# Patient Record
Sex: MALE | Employment: OTHER | URBAN - METROPOLITAN AREA
[De-identification: names, ages, dates, MRNs, and addresses within clinical notes are randomized per-mention and may not be internally consistent; named-entity substitution may affect disease eponyms.]

---

## 2018-06-12 NOTE — H&P
78 y.o. male for open access colonoscopy for screening   Additional data for completion of the targeted pre-endoscopy H&P will be provided under 'H&P interval notes'. Please see that document which will be attached to this. John Good MD  Last colon 2015 3 adenomas.

## 2018-06-13 ENCOUNTER — ANESTHESIA (OUTPATIENT)
Dept: ENDOSCOPY | Age: 78
End: 2018-06-13
Payer: MEDICARE

## 2018-06-13 ENCOUNTER — ANESTHESIA EVENT (OUTPATIENT)
Dept: ENDOSCOPY | Age: 78
End: 2018-06-13
Payer: MEDICARE

## 2018-06-13 ENCOUNTER — HOSPITAL ENCOUNTER (OUTPATIENT)
Age: 78
Setting detail: OUTPATIENT SURGERY
Discharge: HOME | End: 2018-06-13
Attending: SPECIALIST | Admitting: SPECIALIST
Payer: MEDICARE

## 2018-06-13 VITALS
OXYGEN SATURATION: 100 % | TEMPERATURE: 97.5 F | WEIGHT: 162 LBS | HEIGHT: 71 IN | BODY MASS INDEX: 22.68 KG/M2 | RESPIRATION RATE: 11 BRPM | SYSTOLIC BLOOD PRESSURE: 128 MMHG | HEART RATE: 59 BPM | DIASTOLIC BLOOD PRESSURE: 92 MMHG

## 2018-06-13 PROCEDURE — 74011250636 HC RX REV CODE- 250/636: Performed by: PHYSICIAN ASSISTANT

## 2018-06-13 PROCEDURE — 74011000250 HC RX REV CODE- 250

## 2018-06-13 PROCEDURE — 76040000019: Performed by: SPECIALIST

## 2018-06-13 PROCEDURE — 76060000031 HC ANESTHESIA FIRST 0.5 HR: Performed by: SPECIALIST

## 2018-06-13 PROCEDURE — 74011250636 HC RX REV CODE- 250/636

## 2018-06-13 RX ORDER — LANOLIN ALCOHOL/MO/W.PET/CERES
1 CREAM (GRAM) TOPICAL DAILY
COMMUNITY

## 2018-06-13 RX ORDER — NALOXONE HYDROCHLORIDE 0.4 MG/ML
0.4 INJECTION, SOLUTION INTRAMUSCULAR; INTRAVENOUS; SUBCUTANEOUS
Status: DISCONTINUED | OUTPATIENT
Start: 2018-06-13 | End: 2018-06-13 | Stop reason: HOSPADM

## 2018-06-13 RX ORDER — EPINEPHRINE 0.1 MG/ML
1 INJECTION INTRACARDIAC; INTRAVENOUS
Status: DISCONTINUED | OUTPATIENT
Start: 2018-06-13 | End: 2018-06-13 | Stop reason: HOSPADM

## 2018-06-13 RX ORDER — FLUMAZENIL 0.1 MG/ML
0.2 INJECTION INTRAVENOUS
Status: DISCONTINUED | OUTPATIENT
Start: 2018-06-13 | End: 2018-06-13 | Stop reason: HOSPADM

## 2018-06-13 RX ORDER — SODIUM CHLORIDE 9 MG/ML
50 INJECTION, SOLUTION INTRAVENOUS CONTINUOUS
Status: DISCONTINUED | OUTPATIENT
Start: 2018-06-13 | End: 2018-06-13 | Stop reason: HOSPADM

## 2018-06-13 RX ORDER — LANOLIN ALCOHOL/MO/W.PET/CERES
325 CREAM (GRAM) TOPICAL
COMMUNITY

## 2018-06-13 RX ORDER — PROPOFOL 10 MG/ML
INJECTION, EMULSION INTRAVENOUS
Status: DISCONTINUED | OUTPATIENT
Start: 2018-06-13 | End: 2018-06-13 | Stop reason: HOSPADM

## 2018-06-13 RX ORDER — LIDOCAINE HYDROCHLORIDE 20 MG/ML
INJECTION, SOLUTION EPIDURAL; INFILTRATION; INTRACAUDAL; PERINEURAL AS NEEDED
Status: DISCONTINUED | OUTPATIENT
Start: 2018-06-13 | End: 2018-06-13 | Stop reason: HOSPADM

## 2018-06-13 RX ORDER — PROPOFOL 10 MG/ML
INJECTION, EMULSION INTRAVENOUS AS NEEDED
Status: DISCONTINUED | OUTPATIENT
Start: 2018-06-13 | End: 2018-06-13 | Stop reason: HOSPADM

## 2018-06-13 RX ORDER — FENTANYL CITRATE 50 UG/ML
25 INJECTION, SOLUTION INTRAMUSCULAR; INTRAVENOUS AS NEEDED
Status: DISCONTINUED | OUTPATIENT
Start: 2018-06-13 | End: 2018-06-13 | Stop reason: HOSPADM

## 2018-06-13 RX ORDER — MIDAZOLAM HYDROCHLORIDE 1 MG/ML
.25-5 INJECTION, SOLUTION INTRAMUSCULAR; INTRAVENOUS AS NEEDED
Status: DISCONTINUED | OUTPATIENT
Start: 2018-06-13 | End: 2018-06-13 | Stop reason: HOSPADM

## 2018-06-13 RX ORDER — ATROPINE SULFATE 0.1 MG/ML
0.5 INJECTION INTRAVENOUS
Status: DISCONTINUED | OUTPATIENT
Start: 2018-06-13 | End: 2018-06-13 | Stop reason: HOSPADM

## 2018-06-13 RX ORDER — DEXTROMETHORPHAN/PSEUDOEPHED 2.5-7.5/.8
1.2 DROPS ORAL
Status: DISCONTINUED | OUTPATIENT
Start: 2018-06-13 | End: 2018-06-13 | Stop reason: HOSPADM

## 2018-06-13 RX ADMIN — PROPOFOL 80 MG: 10 INJECTION, EMULSION INTRAVENOUS at 10:20

## 2018-06-13 RX ADMIN — SODIUM CHLORIDE: 900 INJECTION, SOLUTION INTRAVENOUS at 10:00

## 2018-06-13 RX ADMIN — LIDOCAINE HYDROCHLORIDE 40 MG: 20 INJECTION, SOLUTION EPIDURAL; INFILTRATION; INTRACAUDAL; PERINEURAL at 10:20

## 2018-06-13 RX ADMIN — PROPOFOL 140 MCG/KG/MIN: 10 INJECTION, EMULSION INTRAVENOUS at 10:20

## 2018-06-13 NOTE — ROUTINE PROCESS
[facility]  1940  [id number]    Situation:  Verbal report received from: Gerson Lundberg RN   Procedure: Procedure(s):  COLONOSCOPY    Background:    Preoperative diagnosis: PERSONAL HISORY COLONIC POLYPS  Postoperative diagnosis: Diverticulosis    :  Dr. Janiya Owens  Assistant(s): Endoscopy Technician-1: Ann Hagan  Endoscopy RN-1: Mehdi Swain RN    Specimens: * No specimens in log *  H. Pylori  no    Assessment:  Intra-procedure medications       Anesthesia gave intra-procedure sedation and medications, see anesthesia flow sheet yes    Intravenous fluids: NS@ KVO     Vital signs stable     Abdominal assessment: round and soft     Recommendation:  Discharge patient per MD order  Family or Friend   Permission to share finding with family or friend yes    Endoscopy discharge instructions have been reviewed and given to patient and spouse. The patient and spouse verbalized understanding and acceptance of instructions.

## 2018-06-13 NOTE — PROCEDURES
[facility] SAMANTHA Kelly MD  [phone]      2018    Colonoscopy Procedure Note  Coreen Coronel  :  1940  Dheeraj Medical Record Number: [id number]    Indications:     Personal history of colon polyps (screening only), Screening colonoscopy  PCP:  Celestina Grissom MD  Anesthesia/Sedation: Conscious Sedation/Moderate Sedation  Endoscopist:  Dr. Sylvain Villarreal  Complications:  None  Estimated Blood Loss:  None    Permit:  The indications, risks, benefits and alternatives were reviewed with the patient or their decision maker who was provided an opportunity to ask questions and all questions were answered. The specific risks of colonoscopy with conscious sedation were reviewed, including but not limited to anesthetic complication, bleeding, adverse drug reaction, missed lesion, infection, IV site reactions, and intestinal perforation which would lead to the need for surgical repair. Alternatives to colonoscopy including radiographic imaging, observation without testing, or laboratory testing were reviewed including the limitations of those alternatives. After considering the options and having all their questions answered, the patient or their decision maker provided both verbal and written consent to proceed. Procedure in Detail:  After obtaining informed consent, positioning of the patient in the left lateral decubitus position, and conduction of a pre-procedure pause or \"time out\" the endoscope was introduced into the anus and advanced to the cecum, which was identified by the ileocecal valve and appendiceal orifice. The quality of the colonic preparation was good. A careful inspection was made as the colonoscope was withdrawn, findings and interventions are described below. Findings:    There is diverticulosis in the sigmoid colon without complications such as bleeding, inflammatory change, or luminal narrowing. Specimens:    none    Complications:   None; patient tolerated the procedure well. Impression:  Normal colonoscopy to the cecum, with no evidence of neoplasia, clinically significant diverticular disease, or mucosal abnormality. Recommendations:     - Follow up with primary care physician. Given age 78 and no polyps found today; further colorectal cancer screening is not indicated. Naturally if signs or symptoms of colonic disease develop a diagnostic exam can be considered at any age. Thank you for entrusting me with this patient's care. Please do not hesitate to contact me with any questions or if I can be of assistance with any of your other patients' GI needs.     Signed By: Micheal Desai MD                        June 13, 2018

## 2018-06-13 NOTE — PERIOP NOTES

## 2018-06-13 NOTE — IP AVS SNAPSHOT
[facility] 
 
 
 [address] [place] [address] Road [address] [place] [address] 
[phone] Patient: Shelley Paul MRN: [id number] :1940 About your hospitalization You were admitted on:  2018 You last received care in the:  [facility] ENDOSCOPY You were discharged on:  2018 Why you were hospitalized Your primary diagnosis was:  Not on File Follow-up Information None Discharge Orders None A check kristopher indicates which time of day the medication should be taken. My Medications CONTINUE taking these medications Instructions Each Dose to Equal  
 Morning Noon Evening Bedtime ALPRAZolam 0.25 mg tablet Commonly known as:  Lamount Marten Your last dose was: Your next dose is: Take 0.5 mg by mouth nightly as needed for Anxiety. 0.5 mg  
    
   
   
   
  
 DAILY MULTIPLE tablet Generic drug:  multivitamin Your last dose was: Your next dose is: Take 1 Tab by mouth daily. 1 Tab  
    
   
   
   
  
 glucosamine-chondroitin 500-400 mg Cap Commonly known as:  [address] [place] [address] Your last dose was: Your next dose is: Take 1 Cap by mouth daily. 1 Cap Iron 325 mg (65 mg iron) tablet Generic drug:  ferrous sulfate Your last dose was: Your next dose is: Take  by mouth Daily (before breakfast). PLAVIX 75 mg Tab Generic drug:  clopidogrel Your last dose was: Your next dose is: Take 75 mg by mouth daily. 75 mg  
    
   
   
   
  
 pravastatin 20 mg tablet Commonly known as:  PRAVACHOL Your last dose was: Your next dose is: Take 20 mg by mouth nightly. 20 mg Discharge Instructions [address] Anny Mendoza. Glee Councilman, [address] [place] [address] 
[phone] June 13, 2018 Shea Mendez YOB: 1940 COLONOSCOPY DISCHARGE INSTRUCTIONS If there is redness at IV site you should apply warm compress to area. If redness or soreness persist contact Dr. Glee Councilman' or your primary care doctor. There may be a slight amount of blood passed from the rectum. Gaseous discomfort may develop, but walking, belching will help relieve this. You may not operate a vehicle for 12 hours You may not operate machinery or dangerous appliances for rest of today You may not drink alcoholic beverages for 12 hours Avoid making any critical decisions for 24 hours DIET: 
You may resume your normal diet, but some patients find that heavy or large meals may lead to indigestion or vomiting. I suggest a light meal as first food intake. MEDICATIONS: 
The use of some over-the-counter pain medication may lead to bleeding after colon biopsies or polyp removal.  Tylenol (also called acetaminophen) is safe to take even if you have had colonoscopy with polyp removal.  Based on the procedure you had today you may safely take aspirin or aspirin-like products for the next ten (10) days. Remember that Tylenol (also called acetaminophen) is safe to take after colonoscopy even if you have had biopsies or polyps removed. ACTIVITY: 
You may resume your normal household activities, but it is recommended that you spend the remainder of the day resting -  avoid any strenuous activity. CALL DR. Lynnette Conway' OFFICE IF: Increasing pain, nausea, vomiting Abdominal distension (swelling) Significant new or increased bleeding (oral or rectal) Fever/Chills Chest pain/shortness of breath Additional instructions: No polyps found today - great news. You do not have to do colonoscopy again unless you develop new symptoms such as bleeding abdominal pain or the like. It was an honor to be your doctor today. Please let me or my office staff know if you have any feedback about today's procedure. Naun Echevarria MD 
 
Colonoscopy saves lives, and can prevent colon cancer. Everyone aged 48 or older needs colonoscopy. Tell your family and friends: get the test! 
 
 
3 Introducing [facility]! [facility] introduces LumiFold patient portal. Now you can access parts of your medical record, email your doctor's office, and request medication refills online. 1. In your internet browser, go to [email or web address] 2. Click on the First Time User? Click Here link in the Sign In box. You will see the New Member Sign Up page. 3. Enter your LumiFold Access Code exactly as it appears below. You will not need to use this code after youve completed the sign-up process. If you do not sign up before the expiration date, you must request a new code. · LumiFold Access Code: [email or web address] Expires: 9/11/2018  8:58 AM 
 
4. Enter the last four digits of your Social Security Number (xxxx) and Date of Birth (mm/dd/yyyy) as indicated and click Submit. You will be taken to the next sign-up page. 5. Create a LumiFold ID. This will be your LumiFold login ID and cannot be changed, so think of one that is secure and easy to remember. 6. Create a LumiFold password. You can change your password at any time. 7. Enter your Password Reset Question and Answer. This can be used at a later time if you forget your password. 8. Enter your e-mail address. You will receive e-mail notification when new information is available in [address] 9. Click Sign Up. You can now view and download portions of your medical record. 10. Click the Download Summary menu link to download a portable copy of your medical information. If you have questions, please visit the Frequently Asked Questions section of the LumiFold website.  Remember, LumiFold is NOT to be used for urgent needs. For medical emergencies, dial 911. Now available from your iPhone and Android! Introducing Kojo Villatoro As a [facility] patient, I wanted to make you aware of our electronic visit tool called Kojo Villatoro. [facility] 24/7 allows you to connect within minutes with a medical provider 24 hours a day, seven days a week via a mobile device or tablet or logging into a secure website from your computer. You can access Kojo Villatoro from anywhere in the [place]. A virtual visit might be right for you when you have a simple condition and feel like you just dont want to get out of bed, or cant get away from work for an appointment, when your regular [facility] provider is not available (evenings, weekends or holidays), or when youre out of town and need minor care. Electronic visits cost only $49 and if the [facility] 24/7 provider determines a prescription is needed to treat your condition, one can be electronically transmitted to a nearby pharmacy*. Please take a moment to enroll today if you have not already done so. The enrollment process is free and takes just a few minutes. To enroll, please download the [facility] 24/7 so to your tablet or phone, or visit [email or web address] to enroll on your computer. And, as an [address] [place] [address] patient with a Wideo account, the results of your visits will be scanned into your electronic medical record and your primary care provider will be able to view the scanned results. We urge you to continue to see your regular [facility] provider for your ongoing medical care. And while your primary care provider may not be the one available when you seek a Kojo Villatoro virtual visit, the peace of mind you get from getting a real diagnosis real time can be priceless. For more information on Kojo Villatoro, view our Frequently Asked Questions (FAQs) at [email or web address].  
 
Sincerely, 
 
 Gabrielle Arnold MD 
Chief Medical Officer [address] *:  certain medications cannot be prescribed via Kojo Villatoro Providers Seen During Your Hospitalization Provider Specialty Primary office phone Zoila Navarrete MD Gastroenterology [phone] Your Primary Care Physician (PCP) Primary Care Physician Office Phone Office Fax Harsha Dawn [phone] You are allergic to the following No active allergies Recent Documentation Height Weight BMI Smoking Status 1.803 m 73.5 kg 22.59 kg/m2 Current Every Day Smoker Emergency Contacts Name Discharge Info Relation Home Work Mobile Lindsey Cutler DISCHARGE CAREGIVER [3] Spouse [3] [phone] Patient Belongings The following personal items are in your possession at time of discharge: 
  Dental Appliances: With patient  Visual Aid: None Please provide this summary of care documentation to your next provider. Signatures-by signing, you are acknowledging that this After Visit Summary has been reviewed with you and you have received a copy. Patient Signature:  ____________________________________________________________ Date:  ____________________________________________________________  
  
[employer] Provider Signature:  ____________________________________________________________ Date:  ____________________________________________________________

## 2018-06-13 NOTE — ANESTHESIA POSTPROCEDURE EVALUATION
Post-Anesthesia Evaluation and Assessment    Patient: Sunil Nguyen MRN: [id number]  SSN: [id number]    YOB: 1940  Age: 78 y.o. Sex: male       Cardiovascular Function/Vital Signs  Visit Vitals    /83    Pulse 62    Temp 36.4 °C (97.5 °F)    Resp 17    Ht 5' 11\" (1.803 m)    Wt 73.5 kg (162 lb)    SpO2 99%    BMI 22.59 kg/m2       Patient is status post MAC anesthesia for Procedure(s):  COLONOSCOPY. Nausea/Vomiting: None    Postoperative hydration reviewed and adequate. Pain:  Pain Scale 1: Numeric (0 - 10) (06/13/18 1046)  Pain Intensity 1: 0 (06/13/18 1046)   Managed    Neurological Status: At baseline    Mental Status and Level of Consciousness: Arousable    Pulmonary Status:   O2 Device: Room air (06/13/18 1046)   Adequate oxygenation and airway patent    Complications related to anesthesia: None    Post-anesthesia assessment completed.  No concerns    Signed By: Mirella Roa MD     June 13, 2018

## 2018-06-13 NOTE — PROGRESS NOTES
Dr Scott Coley in to speak to patient and wife, Regis Burkitt,   Discharge instructions were given bedside with both as well

## 2018-06-13 NOTE — ANESTHESIA PREPROCEDURE EVALUATION
Anesthetic History   No history of anesthetic complications            Review of Systems / Medical History  Patient summary reviewed, nursing notes reviewed and pertinent labs reviewed    Pulmonary          Smoker         Neuro/Psych   Within defined limits           Cardiovascular    Hypertension          Past MI, CAD, cardiac stents and hyperlipidemia    Exercise tolerance: >4 METS  Comments: Not on beta blocker    yard work including push mower without symptoms  Comments: MI & Stent 1997  Femoral stents   GI/Hepatic/Renal  Within defined limits              Endo/Other  Within defined limits           Other Findings   Comments: Bilateral carotid bruits           Physical Exam    Airway  Mallampati: II  TM Distance: 4 - 6 cm  Neck ROM: normal range of motion   Mouth opening: Normal     Cardiovascular  Regular rate and rhythm,  S1 and S2 normal,  no murmur, click, rub, or gallop  Rhythm: regular  Rate: normal         Dental    Dentition: Full upper dentures and Full lower dentures     Pulmonary  Breath sounds clear to auscultation               Abdominal  GI exam deferred       Other Findings            Anesthetic Plan    ASA: 3  Anesthesia type: MAC            Anesthetic plan and risks discussed with: Patient

## 2018-06-13 NOTE — INTERVAL H&P NOTE
Pre-Endoscopy H&P Update  Chief complaint/HPI/ROS:  The indication for the procedure, the patient's history and the patient's current medications are reviewed prior to the procedure and that data is reported on the H&P to which this document is attached. Any significant complaints with regard to organ systems will be noted, and if not mentioned then a review of systems is not contributory. Past Medical History:   Diagnosis Date    CAD (coronary artery disease) 1997    stents      Past Surgical History:   Procedure Laterality Date    CARDIAC SURG PROCEDURE UNLIST  1997    heart and leg stent      Social   Social History   Substance Use Topics    Smoking status: Current Every Day Smoker     Packs/day: 1.50     Years: 60.00    Smokeless tobacco: Not on file    Alcohol use 7.0 oz/week     14 Shots of liquor per week      Family History   Problem Relation Age of Onset    Heart Disease Mother       No Known Allergies   Prior to Admission Medications   Prescriptions Last Dose Informant Patient Reported? Taking? ALPRAZolam (XANAX) 0.25 mg tablet 6/12/2018 at Unknown time  Yes Yes   Sig: Take 0.5 mg by mouth nightly as needed for Anxiety. clopidogrel (PLAVIX) 75 mg tablet 6/12/2018 at Unknown time  Yes Yes   Sig: Take 75 mg by mouth daily. ferrous sulfate (IRON) 325 mg (65 mg iron) tablet   Yes Yes   Sig: Take  by mouth Daily (before breakfast). glucosamine-chondroitin (ARTHX) 500-400 mg cap 6/12/2018 at Unknown time  Yes Yes   Sig: Take 1 Cap by mouth daily. multivitamin (DAILY MULTIPLE) tablet 6/12/2018 at Unknown time  Yes Yes   Sig: Take 1 Tab by mouth daily. pravastatin (PRAVACHOL) 20 mg tablet 6/12/2018 at Unknown time  Yes Yes   Sig: Take 20 mg by mouth nightly. Facility-Administered Medications: None       PHYSICAL EXAM:  The patient is examined immediately prior to the procedure.   Visit Vitals    /67    Pulse 64    Temp 97.9 °F (36.6 °C)    Resp 20    Ht 5' 11\" (1.803 m)    Wt 73.5 kg (162 lb)    SpO2 99%    BMI 22.59 kg/m2     Gen: Appears comfortable, no distress. Pulm: comfortable respirations with no abnormal audible breath sounds  CV: heart regular, well perfused  GI: abdomen flat. PLAN:  Informed consent discussion held, patient afforded an opportunity to ask questions and all questions answered. After being advised of the risks, benefits, and alternatives, the patient requested that we proceed and indicated so on a written consent form. Will proceed with procedure as planned.   Samantha Dixon MD

## 2018-06-13 NOTE — DISCHARGE INSTRUCTIONS
[facility] SAMANTHA Everett MD  [phone]      June 13, 2018    Linnette Serrano  YOB: 1940    COLONOSCOPY DISCHARGE INSTRUCTIONS    If there is redness at IV site you should apply warm compress to area. If redness or soreness persist contact Dr. Spencer Everett' or your primary care doctor. There may be a slight amount of blood passed from the rectum. Gaseous discomfort may develop, but walking, belching will help relieve this. You may not operate a vehicle for 12 hours  You may not operate machinery or dangerous appliances for rest of today  You may not drink alcoholic beverages for 12 hours  Avoid making any critical decisions for 24 hours    DIET:  You may resume your normal diet, but some patients find that heavy or large meals may lead to indigestion or vomiting. I suggest a light meal as first food intake. MEDICATIONS:  The use of some over-the-counter pain medication may lead to bleeding after colon biopsies or polyp removal.  Tylenol (also called acetaminophen) is safe to take even if you have had colonoscopy with polyp removal.  Based on the procedure you had today you may safely take aspirin or aspirin-like products for the next ten (10) days. Remember that Tylenol (also called acetaminophen) is safe to take after colonoscopy even if you have had biopsies or polyps removed. ACTIVITY:  You may resume your normal household activities, but it is recommended that you spend the remainder of the day resting -  avoid any strenuous activity. CALL DR. Bienvenido Lopez' OFFICE IF:  Increasing pain, nausea, vomiting  Abdominal distension (swelling)  Significant new or increased bleeding (oral or rectal)  Fever/Chills  Chest pain/shortness of breath                       Additional instructions:   No polyps found today - great news.   You do not have to do colonoscopy again unless you develop new symptoms such as bleeding abdominal pain or the like. It was an honor to be your doctor today. Please let me or my office staff know if you have any feedback about today's procedure. Daphne Vyas MD    Colonoscopy saves lives, and can prevent colon cancer. Everyone aged 48 or older needs colonoscopy.   Tell your family and friends: get the test!      3

## 2020-03-20 LAB
CREATININE, EXTERNAL: 1.19
LDL-C, EXTERNAL: 42

## 2020-10-28 VITALS
TEMPERATURE: 98.4 F | BODY MASS INDEX: 19.88 KG/M2 | OXYGEN SATURATION: 94 % | SYSTOLIC BLOOD PRESSURE: 114 MMHG | WEIGHT: 142 LBS | HEIGHT: 71 IN | HEART RATE: 71 BPM | DIASTOLIC BLOOD PRESSURE: 62 MMHG

## 2020-10-28 PROBLEM — N52.9 PRIMARY ERECTILE DYSFUNCTION: Status: ACTIVE | Noted: 2020-10-28

## 2020-10-28 PROBLEM — G47.00 INSOMNIA: Status: ACTIVE | Noted: 2020-10-28

## 2020-10-28 PROBLEM — D64.9 ANEMIA: Status: ACTIVE | Noted: 2020-10-28

## 2020-10-28 PROBLEM — F41.9 ANXIETY: Status: ACTIVE | Noted: 2020-10-28

## 2020-10-28 PROBLEM — E55.9 VITAMIN D DEFICIENCY: Status: ACTIVE | Noted: 2020-10-28

## 2020-11-04 ENCOUNTER — OFFICE VISIT (OUTPATIENT)
Dept: FAMILY MEDICINE CLINIC | Age: 80
End: 2020-11-04
Payer: MEDICARE

## 2020-11-04 VITALS
OXYGEN SATURATION: 97 % | WEIGHT: 142 LBS | RESPIRATION RATE: 12 BRPM | HEART RATE: 81 BPM | HEIGHT: 71 IN | DIASTOLIC BLOOD PRESSURE: 70 MMHG | TEMPERATURE: 97.3 F | SYSTOLIC BLOOD PRESSURE: 124 MMHG | BODY MASS INDEX: 19.88 KG/M2

## 2020-11-04 DIAGNOSIS — I10 HYPERTENSION, UNSPECIFIED TYPE: Primary | ICD-10-CM

## 2020-11-04 DIAGNOSIS — E78.5 HYPERLIPIDEMIA, UNSPECIFIED HYPERLIPIDEMIA TYPE: ICD-10-CM

## 2020-11-04 PROCEDURE — G8432 DEP SCR NOT DOC, RNG: HCPCS | Performed by: NURSE PRACTITIONER

## 2020-11-04 PROCEDURE — G8536 NO DOC ELDER MAL SCRN: HCPCS | Performed by: NURSE PRACTITIONER

## 2020-11-04 PROCEDURE — G8752 SYS BP LESS 140: HCPCS | Performed by: NURSE PRACTITIONER

## 2020-11-04 PROCEDURE — G8754 DIAS BP LESS 90: HCPCS | Performed by: NURSE PRACTITIONER

## 2020-11-04 PROCEDURE — G8427 DOCREV CUR MEDS BY ELIG CLIN: HCPCS | Performed by: NURSE PRACTITIONER

## 2020-11-04 PROCEDURE — G0439 PPPS, SUBSEQ VISIT: HCPCS | Performed by: NURSE PRACTITIONER

## 2020-11-04 PROCEDURE — 1101F PT FALLS ASSESS-DOCD LE1/YR: CPT | Performed by: NURSE PRACTITIONER

## 2020-11-04 PROCEDURE — G8420 CALC BMI NORM PARAMETERS: HCPCS | Performed by: NURSE PRACTITIONER

## 2020-11-04 RX ORDER — GLUCOSAMINE SULFATE 1500 MG
POWDER IN PACKET (EA) ORAL DAILY
COMMUNITY
End: 2021-08-25 | Stop reason: ALTCHOICE

## 2020-11-04 RX ORDER — OXYBUTYNIN CHLORIDE 5 MG/1
5 TABLET ORAL 3 TIMES DAILY
Qty: 90 TAB | Refills: 1 | Status: SHIPPED | OUTPATIENT
Start: 2020-11-04 | End: 2021-01-07

## 2020-11-04 RX ORDER — CALCIUM ACETATE 667 MG/1
CAPSULE ORAL
COMMUNITY

## 2020-11-04 NOTE — PROGRESS NOTES
Medicare Wellness Exam:    Chief Complaint   Patient presents with    Annual Wellness Visit     SUBSEQUENT     he is a 70y.o. year old male who presents for evaluation for their Medicare Wellness Visit. Screenings as documented in care gaps. Patient is also c/o some urine leakage for the last few months. He has not tried anything that has made it better or worse    Fall Screen is completed and assessed=yes  Depression Screen is completed and assessed=yes  Medication list reviewed and adjusted for accuracy=yes  Immunizations reviewed and updated=yes  Health/Preventative Screenings reviewed and updated=yes  ADL Functions reviewed=yes  See scanned medicare wellness documents for full details. Patient Active Problem List    Diagnosis    Anemia    Anxiety    Insomnia    Primary erectile dysfunction    Vitamin D deficiency    Impingement syndrome of left shoulder    Sprain of left rotator cuff capsule    History of MI (myocardial infarction)    History of peripheral vascular disease    Hyperlipemia    Bilateral carotid bruits    HTN (hypertension)    CAD (coronary artery disease)       Reviewed PmHx, RxHx, FmHx, SocHx, AllgHx and updated and dated in the chart. ROS     Objective:     Vitals:    11/04/20 1428   BP: 124/70   Pulse: 81   Resp: 12   Temp: 97.3 °F (36.3 °C)   TempSrc: Temporal   SpO2: 97%   Weight: 142 lb (64.4 kg)   Height: 5' 11\" (1.803 m)     Physical Exam  Vitals signs reviewed. HENT:      Head: Normocephalic. Neck:      Musculoskeletal: Normal range of motion and neck supple. Cardiovascular:      Rate and Rhythm: Normal rate and regular rhythm. Heart sounds: Normal heart sounds. Pulmonary:      Effort: Pulmonary effort is normal.      Breath sounds: Normal breath sounds. Abdominal:      General: Bowel sounds are normal.      Palpations: Abdomen is soft. Musculoskeletal: Normal range of motion. Skin:     General: Skin is warm and dry.    Neurological:      Mental Status: He is alert and oriented to person, place, and time. Psychiatric:         Mood and Affect: Mood normal.         Behavior: Behavior normal.         Thought Content: Thought content normal.         Judgment: Judgment normal.          Assessment/ Plan:   Diagnoses and all orders for this visit:    1. Hypertension, unspecified type  -     CBC WITH AUTOMATED DIFF  -     METABOLIC PANEL, COMPREHENSIVE    2. Hyperlipidemia, unspecified hyperlipidemia type  -     LIPID PANEL    Other orders  -     oxybutynin (DITROPAN) 5 mg tablet; Take 1 Tab by mouth three (3) times daily.         -Pain evaluation performed in office  -Cognitive Screen performed in office  -Depression Screen, Fall risks (by up and go test)  and ADL functionality were addressed  -Medication list updated and reviewed for any changes   -A comprehensive review of medical issues and a plan was formulated  -End of life planning was addressed with pt   -Health Screenings for preventions were addressed and a plan was formulated  -Shingles Vaccine was recommended  -Discussed with patient cancer risk factors and appropriate screenings for age  -Patient evaluated for colonoscopy and referred if needed per screeing criteria  -Labs from previous visits were discussed with patient   -Discussed with patient diet and exercise and formulated a plan as needed  -An Advanced care plan was developed with the patient.  -Alcohol screening performed and was negative    -    I have discussed the diagnosis with the patient and the intended plan as seen in the above orders. The patient understands and agrees with the plan. The patient has received an after-visit summary and questions were answered concerning future plans. Medication Side Effects and Warnings were discussed with patien  Patient Labs were reviewed and or requested  Patient Past Records were reviewed and or requested    There are no Patient Instructions on file for this visit.       Guillermina Cristobal, NP

## 2020-11-05 LAB
ALBUMIN SERPL-MCNC: 4.8 G/DL (ref 3.7–4.7)
ALBUMIN/GLOB SERPL: 2.8 {RATIO} (ref 1.2–2.2)
ALP SERPL-CCNC: 84 IU/L (ref 39–117)
ALT SERPL-CCNC: 13 IU/L (ref 0–44)
AST SERPL-CCNC: 22 IU/L (ref 0–40)
BASOPHILS # BLD AUTO: 0 X10E3/UL (ref 0–0.2)
BASOPHILS NFR BLD AUTO: 1 %
BILIRUB SERPL-MCNC: 0.4 MG/DL (ref 0–1.2)
BUN SERPL-MCNC: 14 MG/DL (ref 8–27)
BUN/CREAT SERPL: 12 (ref 10–24)
CALCIUM SERPL-MCNC: 9.4 MG/DL (ref 8.6–10.2)
CHLORIDE SERPL-SCNC: 100 MMOL/L (ref 96–106)
CHOLEST SERPL-MCNC: 144 MG/DL (ref 100–199)
CO2 SERPL-SCNC: 26 MMOL/L (ref 20–29)
CREAT SERPL-MCNC: 1.18 MG/DL (ref 0.76–1.27)
EOSINOPHIL # BLD AUTO: 0.1 X10E3/UL (ref 0–0.4)
EOSINOPHIL NFR BLD AUTO: 3 %
ERYTHROCYTE [DISTWIDTH] IN BLOOD BY AUTOMATED COUNT: 12.1 % (ref 11.6–15.4)
GLOBULIN SER CALC-MCNC: 1.7 G/DL (ref 1.5–4.5)
GLUCOSE SERPL-MCNC: 88 MG/DL (ref 65–99)
HCT VFR BLD AUTO: 38.1 % (ref 37.5–51)
HDLC SERPL-MCNC: 76 MG/DL
HGB BLD-MCNC: 12.6 G/DL (ref 13–17.7)
IMM GRANULOCYTES # BLD AUTO: 0 X10E3/UL (ref 0–0.1)
IMM GRANULOCYTES NFR BLD AUTO: 0 %
LDLC SERPL CALC-MCNC: 53 MG/DL (ref 0–99)
LYMPHOCYTES # BLD AUTO: 1.1 X10E3/UL (ref 0.7–3.1)
LYMPHOCYTES NFR BLD AUTO: 21 %
MCH RBC QN AUTO: 32.6 PG (ref 26.6–33)
MCHC RBC AUTO-ENTMCNC: 33.1 G/DL (ref 31.5–35.7)
MCV RBC AUTO: 99 FL (ref 79–97)
MONOCYTES # BLD AUTO: 0.4 X10E3/UL (ref 0.1–0.9)
MONOCYTES NFR BLD AUTO: 8 %
NEUTROPHILS # BLD AUTO: 3.5 X10E3/UL (ref 1.4–7)
NEUTROPHILS NFR BLD AUTO: 67 %
PLATELET # BLD AUTO: 168 X10E3/UL (ref 150–450)
POTASSIUM SERPL-SCNC: 4.7 MMOL/L (ref 3.5–5.2)
PROT SERPL-MCNC: 6.5 G/DL (ref 6–8.5)
RBC # BLD AUTO: 3.86 X10E6/UL (ref 4.14–5.8)
SODIUM SERPL-SCNC: 139 MMOL/L (ref 134–144)
TRIGL SERPL-MCNC: 76 MG/DL (ref 0–149)
VLDLC SERPL CALC-MCNC: 15 MG/DL (ref 5–40)
WBC # BLD AUTO: 5.3 X10E3/UL (ref 3.4–10.8)

## 2020-11-16 VITALS
HEART RATE: 71 BPM | SYSTOLIC BLOOD PRESSURE: 114 MMHG | OXYGEN SATURATION: 94 % | WEIGHT: 142 LBS | HEIGHT: 71 IN | TEMPERATURE: 98.4 F | DIASTOLIC BLOOD PRESSURE: 62 MMHG | BODY MASS INDEX: 19.88 KG/M2

## 2020-11-16 PROBLEM — I77.9 PERIPHERAL ARTERIAL OCCLUSIVE DISEASE (HCC): Status: ACTIVE | Noted: 2020-11-16

## 2020-11-16 RX ORDER — SILDENAFIL 100 MG/1
100 TABLET, FILM COATED ORAL AS NEEDED
COMMUNITY
End: 2021-04-30

## 2021-01-07 RX ORDER — OXYBUTYNIN CHLORIDE 5 MG/1
TABLET ORAL
Qty: 90 TAB | Refills: 1 | Status: SHIPPED | OUTPATIENT
Start: 2021-01-07 | End: 2021-03-25

## 2021-01-16 ENCOUNTER — TRANSCRIBE ORDER (OUTPATIENT)
Dept: EMERGENCY DEPT | Age: 81
End: 2021-01-16

## 2021-01-16 ENCOUNTER — HOSPITAL ENCOUNTER (OUTPATIENT)
Dept: LAB | Age: 81
Discharge: HOME | End: 2021-01-16
Payer: MEDICARE

## 2021-01-16 DIAGNOSIS — Z01.812 PRE-PROCEDURAL LABORATORY EXAMINATIONS: ICD-10-CM

## 2021-01-16 DIAGNOSIS — Z01.812 PRE-PROCEDURAL LABORATORY EXAMINATIONS: Primary | ICD-10-CM

## 2021-01-16 PROCEDURE — U0003 INFECTIOUS AGENT DETECTION BY NUCLEIC ACID (DNA OR RNA); SEVERE ACUTE RESPIRATORY SYNDROME CORONAVIRUS 2 (SARS-COV-2) (CORONAVIRUS DISEASE [COVID-19]), AMPLIFIED PROBE TECHNIQUE, MAKING USE OF HIGH THROUGHPUT TECHNOLOGIES AS DESCRIBED BY CMS-2020-01-R: HCPCS

## 2021-01-17 LAB — SARS-COV-2, COV2NT: NOT DETECTED

## 2021-01-19 ENCOUNTER — ANESTHESIA EVENT (OUTPATIENT)
Dept: ENDOSCOPY | Age: 81
End: 2021-01-19
Payer: MEDICARE

## 2021-01-20 ENCOUNTER — ANESTHESIA (OUTPATIENT)
Dept: ENDOSCOPY | Age: 81
End: 2021-01-20
Payer: MEDICARE

## 2021-01-20 ENCOUNTER — HOSPITAL ENCOUNTER (OUTPATIENT)
Age: 81
Setting detail: OUTPATIENT SURGERY
Discharge: HOME | End: 2021-01-20
Attending: SPECIALIST | Admitting: SPECIALIST
Payer: MEDICARE

## 2021-01-20 VITALS
BODY MASS INDEX: 19.78 KG/M2 | SYSTOLIC BLOOD PRESSURE: 162 MMHG | HEIGHT: 71 IN | TEMPERATURE: 97.7 F | DIASTOLIC BLOOD PRESSURE: 68 MMHG | WEIGHT: 141.31 LBS | RESPIRATION RATE: 15 BRPM | HEART RATE: 57 BPM | OXYGEN SATURATION: 100 %

## 2021-01-20 PROCEDURE — 74011250636 HC RX REV CODE- 250/636: Performed by: NURSE ANESTHETIST, CERTIFIED REGISTERED

## 2021-01-20 PROCEDURE — 2709999900 HC NON-CHARGEABLE SUPPLY: Performed by: SPECIALIST

## 2021-01-20 PROCEDURE — 76040000019: Performed by: SPECIALIST

## 2021-01-20 PROCEDURE — 74011000250 HC RX REV CODE- 250: Performed by: NURSE ANESTHETIST, CERTIFIED REGISTERED

## 2021-01-20 PROCEDURE — 76060000031 HC ANESTHESIA FIRST 0.5 HR: Performed by: SPECIALIST

## 2021-01-20 RX ORDER — MIDAZOLAM HYDROCHLORIDE 1 MG/ML
.25-5 INJECTION, SOLUTION INTRAMUSCULAR; INTRAVENOUS AS NEEDED
Status: DISCONTINUED | OUTPATIENT
Start: 2021-01-20 | End: 2021-01-20 | Stop reason: HOSPADM

## 2021-01-20 RX ORDER — ROSUVASTATIN CALCIUM 40 MG/1
40 TABLET, COATED ORAL
COMMUNITY
End: 2021-05-10

## 2021-01-20 RX ORDER — PROPOFOL 10 MG/ML
INJECTION, EMULSION INTRAVENOUS
Status: DISCONTINUED | OUTPATIENT
Start: 2021-01-20 | End: 2021-01-20 | Stop reason: HOSPADM

## 2021-01-20 RX ORDER — FLUMAZENIL 0.1 MG/ML
0.2 INJECTION INTRAVENOUS
Status: DISCONTINUED | OUTPATIENT
Start: 2021-01-20 | End: 2021-01-20 | Stop reason: HOSPADM

## 2021-01-20 RX ORDER — SODIUM CHLORIDE 9 MG/ML
INJECTION, SOLUTION INTRAVENOUS
Status: DISCONTINUED | OUTPATIENT
Start: 2021-01-20 | End: 2021-01-20 | Stop reason: HOSPADM

## 2021-01-20 RX ORDER — PROPOFOL 10 MG/ML
INJECTION, EMULSION INTRAVENOUS AS NEEDED
Status: DISCONTINUED | OUTPATIENT
Start: 2021-01-20 | End: 2021-01-20 | Stop reason: HOSPADM

## 2021-01-20 RX ORDER — LIDOCAINE HYDROCHLORIDE 20 MG/ML
INJECTION, SOLUTION EPIDURAL; INFILTRATION; INTRACAUDAL; PERINEURAL AS NEEDED
Status: DISCONTINUED | OUTPATIENT
Start: 2021-01-20 | End: 2021-01-20 | Stop reason: HOSPADM

## 2021-01-20 RX ORDER — SODIUM CHLORIDE 9 MG/ML
50 INJECTION, SOLUTION INTRAVENOUS CONTINUOUS
Status: DISCONTINUED | OUTPATIENT
Start: 2021-01-20 | End: 2021-01-20 | Stop reason: HOSPADM

## 2021-01-20 RX ORDER — FENTANYL CITRATE 50 UG/ML
25 INJECTION, SOLUTION INTRAMUSCULAR; INTRAVENOUS AS NEEDED
Status: DISCONTINUED | OUTPATIENT
Start: 2021-01-20 | End: 2021-01-20 | Stop reason: HOSPADM

## 2021-01-20 RX ORDER — DEXTROMETHORPHAN/PSEUDOEPHED 2.5-7.5/.8
1.2 DROPS ORAL
Status: DISCONTINUED | OUTPATIENT
Start: 2021-01-20 | End: 2021-01-20 | Stop reason: HOSPADM

## 2021-01-20 RX ORDER — NALOXONE HYDROCHLORIDE 0.4 MG/ML
0.4 INJECTION, SOLUTION INTRAMUSCULAR; INTRAVENOUS; SUBCUTANEOUS
Status: DISCONTINUED | OUTPATIENT
Start: 2021-01-20 | End: 2021-01-20 | Stop reason: HOSPADM

## 2021-01-20 RX ADMIN — PROPOFOL 50 MG: 10 INJECTION, EMULSION INTRAVENOUS at 10:54

## 2021-01-20 RX ADMIN — PROPOFOL 50 MG: 10 INJECTION, EMULSION INTRAVENOUS at 10:53

## 2021-01-20 RX ADMIN — PROPOFOL 120 MCG/KG/MIN: 10 INJECTION, EMULSION INTRAVENOUS at 10:53

## 2021-01-20 RX ADMIN — SODIUM CHLORIDE: 9 INJECTION, SOLUTION INTRAVENOUS at 10:42

## 2021-01-20 RX ADMIN — LIDOCAINE HYDROCHLORIDE 80 MG: 20 INJECTION, SOLUTION INTRAVENOUS at 10:54

## 2021-01-20 NOTE — PROGRESS NOTES

## 2021-01-20 NOTE — INTERVAL H&P NOTE
Pre-Endoscopy H&P Update  Chief complaint/HPI/ROS:  The indication for the procedure, the patient's history and the patient's current medications are reviewed prior to the procedure and that data is reported on the H&P to which this document is attached. Any significant complaints with regard to organ systems will be noted, and if not mentioned then a review of systems is not contributory. Past Medical History:   Diagnosis Date    Anemia 10/28/2020    Anxiety 10/28/2020    CAD (coronary artery disease) 1997    stents    Ill-defined condition     High cholesterole    Insomnia 10/28/2020    Peripheral arterial occlusive disease ([address] 11/16/2020    Primary erectile dysfunction 10/28/2020    Vitamin D deficiency 10/28/2020      Past Surgical History:   Procedure Laterality Date    COLONOSCOPY N/A 6/13/2018    COLONOSCOPY performed by Sandra Phillips MD at [facility]      into vein    HX HERNIA REPAIR  04/2020    HX OTHER SURGICAL      complete repair of rotator cuff left shoulder    HX OTHER SURGICAL      insertion of stent into vein    HX OTHER SURGICAL      leg surgery procedure; both legs multi surgeries for blockages    HX ROTATOR CUFF REPAIR      Complete on left shoulder    [place] CARDIAC SURG PROCEDURE UNLIST  1997    heart and leg stent      Social   Social History     Tobacco Use    Smoking status: Current Every Day Smoker     Packs/day: 1.50     Years: 60.00     Pack years: 90.00    Smokeless tobacco: Never Used   Substance Use Topics    Alcohol use: Yes     Alcohol/week: 11.7 standard drinks     Types: 14 Shots of liquor per week      Family History   Problem Relation Age of Onset    Heart Disease Mother       No Known Allergies   Prior to Admission Medications   Prescriptions Last Dose Informant Patient Reported? Taking? ALPRAZolam (XANAX) 0.25 mg tablet 1/19/2021 at Unknown time  Yes Yes   Sig: Take 0.5 mg by mouth nightly as needed for Anxiety.    Iron BisGl &PS [id number] 035-06-06-6 mg-mg-mcg-mg cap 1/19/2021 at Unknown time  Yes Yes   Sig: Take  by mouth.   calcium acetate,phosphat bind, (PHOSLO) 667 mg cap 1/19/2021 at Unknown time  Yes Yes   Sig: Take  by mouth three (3) times daily (with meals). cholecalciferol (Vitamin D3) 25 mcg (1,000 unit) cap 1/19/2021 at Unknown time  Yes Yes   Sig: Take  by mouth daily. clopidogrel (PLAVIX) 75 mg tablet 1/16/2021  Yes No   Sig: Take 75 mg by mouth daily. ferrous sulfate (IRON) 325 mg (65 mg iron) tablet 1/19/2021 at Unknown time  Yes Yes   Sig: Take  by mouth Daily (before breakfast). glucosamine-chondroitin (ARTHX) 500-400 mg cap 1/19/2021 at Unknown time  Yes Yes   Sig: Take 1 Cap by mouth daily. oxybutynin (DITROPAN) 5 mg tablet Not Taking at Unknown time  No No   Sig: TAKE 1 TABLET BY MOUTH THREE TIMES A DAY   rosuvastatin (CRESTOR) 40 mg tablet 1/19/2021 at Unknown time  Yes Yes   Sig: Take 40 mg by mouth nightly. sildenafil citrate (Viagra) 100 mg tablet Unknown at Unknown time  Yes No   Sig: Take 100 mg by mouth as needed for Erectile Dysfunction. Facility-Administered Medications: None       PHYSICAL EXAM:  The patient is examined immediately prior to the procedure. Visit Vitals  BP (!) 152/80   Pulse 62   Temp 97.9 °F (36.6 °C)   Resp 14   Ht 5' 11\" (1.803 m)   Wt 64.1 kg (141 lb 5 oz)   SpO2 100%   BMI 19.71 kg/m²     Gen: Appears comfortable, no distress. Pulm: comfortable respirations with no abnormal audible breath sounds  HEART: well perfused, no abnormal audible heart sounds  GI: abdomen flat. PLAN:  Informed consent discussion held, patient afforded an opportunity to ask questions and all questions answered. After being advised of the risks, benefits, and alternatives, the patient requested that we proceed and indicated so on a written consent form. Will proceed with procedure as planned.   Leonila Beyer MD

## 2021-01-20 NOTE — ROUTINE PROCESS
Sharath Cutler  1940  [id number]    Situation:  Verbal report received from: CHRIS Echols  Procedure: Procedure(s):  ESOPHAGOGASTRODUODENOSCOPY (EGD)    Background:    Preoperative diagnosis: MELENA  LONG TERM CURRENT USE OF ANTICOAGULANTS  ANEMIA  Postoperative diagnosis: 1.- Normal EGD    :  Dr. Gale  Assistant(s): Endoscopy Technician-1: Marika Dalton  Endoscopy RN-1: Grace Echols RN    Specimens: * No specimens in log *  H. Pylori  no    Assessment:  Intra-procedure medications     Anesthesia gave intra-procedure sedation and medications, see anesthesia flow sheet yes    Intravenous fluids: NS@ KVO     Vital signs stable     Abdominal assessment: round and soft     Recommendation:  Discharge patient per MD order  Family-spouse  Permission to share finding with family or friend yes    Endoscopy discharge instructions have been reviewed and given to patient.  The patient verbalized understanding and acceptance of instructions.      Dr. Gale discussed with spouse procedure findings and next steps.

## 2021-01-20 NOTE — ANESTHESIA POSTPROCEDURE EVALUATION
Procedure(s):  ESOPHAGOGASTRODUODENOSCOPY (EGD). MAC    Anesthesia Post Evaluation      Multimodal analgesia: multimodal analgesia not used between 6 hours prior to anesthesia start to PACU discharge  Patient location during evaluation: PACU  Patient participation: complete - patient participated  Level of consciousness: awake  Pain management: adequate  Airway patency: patent  Anesthetic complications: no  Cardiovascular status: acceptable  Respiratory status: acceptable  Hydration status: acceptable  Post anesthesia nausea and vomiting:  controlled  Final Post Anesthesia Temperature Assessment:  Normothermia (36.0-37.5 degrees C)      INITIAL Post-op Vital signs:   Vitals Value Taken Time   /68 01/20/21 1125   Temp 36.5 °C (97.7 °F) 01/20/21 1105   Pulse 57 01/20/21 1130   Resp 15 01/20/21 1130   SpO2 100 % 01/20/21 1130   Vitals shown include unvalidated device data.

## 2021-01-20 NOTE — ANESTHESIA PREPROCEDURE EVALUATION
Relevant Problems   CARDIOVASCULAR   (+) CAD (coronary artery disease)   (+) HTN (hypertension)   (+) Peripheral arterial occlusive disease (HCC)      HEMATOLOGY   (+) Anemia       Anesthetic History   No history of anesthetic complications            Review of Systems / Medical History  Patient summary reviewed and pertinent labs reviewed    Pulmonary          Smoker         Neuro/Psych   Within defined limits           Cardiovascular    Hypertension    Angina      CAD, PAD and cardiac stents      Comments: H/o angina found to have Occluded RCA?  Stent >> medical management on Plavix    No current CP or SOB   GI/Hepatic/Renal  Within defined limits              Endo/Other  Within defined limits           Other Findings            Physical Exam    Airway  Mallampati: II      Mouth opening: Normal     Cardiovascular    Rhythm: regular  Rate: normal         Dental    Dentition: Full upper dentures and Full lower dentures     Pulmonary      Decreased breath sounds           Abdominal  GI exam deferred       Other Findings            Anesthetic Plan    ASA: 4  Anesthesia type: MAC          Induction: Intravenous  Anesthetic plan and risks discussed with: Patient

## 2021-01-20 NOTE — H&P
Date: 2020 10:15 AM   Patient Name: Alina Smith   Account #: [id number]    Gender: Male    (age): 1940 (70)       Provider:     NAIDA CisnerosBC        Referring Physician:     Yobani Alba. Jada Larson, [address], [facility], [place]  [phone] (phone)  [phone] (fax)        Chief Complaint: GI Bleed           History of Present Illness:   Patient comes in office for follow up with complaint of episodes of loose black/ tarry stools for 3 days. Patient reports loose stools have resolved. Went to [facility] for symptoms on 2020. Stated had blood work done and was given hemoccult cards for testing. Patient admits to cigarettes daily, alcohol consumption, no NSAID usage. Patient reports he is on Plavix- history of cardiac stents and peripheral stent to lower extremities. Patient denies nausea, no vomiting, no chest pain, no shortness of breath, no abdominal pain, no heartburn, no reflux. Denies diarrhea, hematochezia with bright red stools. Denies fever, no chest pain, no shortness of breath. Patient underwent colonoscopy in 2018 only findings of diverticulosis. ?Patient comes in office for follow up with complaint of episodes of loose black/ tarry stools for 3 days. Patient reports loose stools have resolved. Went to [facility] for symptoms on 2020. Stated had blood work done and was given hemoccult cards for testing. Patient admits to cigarettes daily, alcohol consumption, no NSAID usage. Patient reports he is on Plavix- history of cardiac stents and peripheral stent to lower extremities. ?Patient denies nausea, no vomiting, no chest pain, no shortness of breath, no abdominal pain, no heartburn, no reflux. Denies diarrhea, hematochezia with bright red stools. Denies fever, no chest pain, no shortness of breath. Patient underwent colonoscopy in 2018 only findings of diverticulosis.         Past Medical History      Medical Conditions: Coronary arteriosclerosis   Surgical Procedures: Stents  Rotator Cuff Repair   Dx Studies: No Prior Diagnostic Studies   Medications: alprazolam 0.5 mg Take 1 tablet by mouth once a day  cholecalciferol (vitamin D3) 10 mcg (400 unit) Take 1 tablet by mouth once a day  cilostazol 50 mg Take 1 tablet by mouth once a day  clopidogrel 75 mg Take 1 tablet by mouth once a day  glucosamine-chondroitin 500-400 mg Take 1 capsule by mouth once a day  metoprolol succinate 50 mg Take 1 tablet by mouth once a day  nitroglycerin 0.4 mg Take 1 tablet by mouth as needed for pain  oxybutynin chloride 5 mg Take 1 tablet by mouth once a day  rosuvastatin 40 mg Take 1 tablet by mouth once a day  Xanax 1 mg Take 1 tablet by mouth once a day   Allergies: Patient has no known allergies or drug allergies   Immunizations: Influenza vaccination (refused)      Social History      Alcohol: 3 drinks daily. Tobacco: Current every day smoker   Drugs: None   Exercise: None   Caffeine: None   Marital Status:          Occupation:               Family History No Knowledge Of Family History       Review of Systems:   Cardiovascular: Denies chest pain, irregular heart beat, palpitations, peripheral edema, syncope, Sweats. Constitutional: Presents suffers from loss of appetite, weight loss. Denies fatigue, fever, weight gain. ENMT: Presents suffers from sore throat. Denies nose bleeds, hearing loss. Endocrine: Denies excessive thirst, heat intolerance. Eyes: Denies loss of vision. Gastrointestinal: Presents suffers from diarrhea. Denies abdominal pain, abdominal swelling, change in bowel habits, constipation, Bloating/gas, heartburn, jaundice, nausea, rectal bleeding, stomach cramps, vomiting, dysphagia, rectal pain, Stool incontinence, hematemesis. Genitourinary: Denies dark urine, dysuria, frequent urination, hematuria, incontinence. Hematologic/Lymphatic: Denies easy bruising, prolonged bleeding.    Integumentary: Denies itching, rashes, sun sensitivity. Musculoskeletal: Denies arthritis, back pain, gout, joint pain, muscle weakness, stiffness. Neurological: Denies dizziness, fainting, frequent headaches, memory loss. Psychiatric: Presents suffers from anxiety. Denies depression, difficulty sleeping, hallucinations, nervousness, panic attacks, paranoia. Respiratory: Denies cough, dyspnea, wheezing. Vital Signs:   BP  (mmHg)  Pulse  (ppm) Weight (lbs/oz) Height (ft/in) BMI Temp   128/62 72 141 / 2 6 / 0 19.14 97.7 (F)      Physical Exam:            Lab Results: No Electronic Results      Impressions: Melena  Anemia, unspecified  Long term (current) use of anticoagulants? ?Melena? ?  ? ? Anemia, unspecified? ?  ? ? Long term (current) use of anticoagulants? Assessment: 70year old male with episodes of melena with black/tarry stools, resolved. Reviewed [facility] documentation from 12/21/2020- hemoglobin low at 11.4. BMP within normal range. Patient to have EGD evaluation for gastric ulcers, AVMs, etc. Placed patient on Omeprazole 40 mg daily. Encourage smoking cessation. Patient agreed to the above. ? 70year old male with episodes of melena with black/tarry stools, resolved. Reviewed [facility] documentation from 12/21/2020- hemoglobin low at 11.4. BMP within normal range. Patient to have EGD evaluation for gastric ulcers, AVMs, etc. Placed patient on Omeprazole 40 mg daily. Encourage smoking cessation. Patient agreed to the above. Plan: EGD  EGD-Education  GI Bleeding-Education  omeprazole 40 mg Take 1 capsule daily by mouth  Education Handout -Smoking/Benefits of Quitting? ?EGD? ?  ? ?EGD-Education? ?  ? ?GI Bleeding-Education? ?  ? ?omeprazole? ?40 mg? ? Take 1 capsule daily by mouth? ? ?  ? ? Education Handout -Smoking/Benefits of Quitting? Risk & Medical Necessity: The patient requires Moderate to High Severity care for this visit. Diagnosis and management options are Multiple.  The amount of data reviewed and/or ordered is Moderate. The level of risk is Moderate.            Notes:              ARTEMIO Cristobal     Electronically signed on 2020 10:55:23 AM by ARTEMIO Cristobal                                 [address] [place] [address], [id number],  1940 [facility] Follow Up,                                                                                                                                                         New     Modify          Delete     Delete all     Edit Wording          Sign     [email or web address]

## 2021-01-20 NOTE — DISCHARGE INSTRUCTIONS
[address] [facility] SAMANTHA Swartz Officer, MD  [phone]      January 20, 2021     tG Velazco  YOB: 1940    ENDOSCOPY DISCHARGE INSTRUCTIONS    If there is redness at IV site you should apply warm compress to area. If redness or soreness persist contact Dr. Swartz Officer' or your primary care doctor. Gaseous discomfort may develop, but walking, belching will help relieve this. You may not operate a vehicle for 12 hours  You may not operate machinery or dangerous appliances for rest of today  You may not drink alcoholic beverages for 12 hours  Avoid making any critical decisions for 24 hours    DIET:  You may resume your normal diet, but some patients find that heavy or large meals may lead to indigestion or vomiting. I suggest a light meal as first food intake. MEDICATIONS:  The use of some over-the-counter pain medication may lead to bleeding after biopsies or other procedures you may have had done. Tylenol (also called acetaminophen) is safe to take and will not lead to bleeding. Based on the procedure you had today you may safely take aspirin or aspirin-like products for the next ten (10) days. ACTIVITY:  You may resume your normal household activities, but it is recommended that you spend the remainder of the day resting -  avoid any strenuous activity. CALL DR. Armando Baron' OFFICE IF:  Increasing pain, nausea, vomiting  Abdominal distension (swelling)  Significant new or increased bleeding (oral or rectal)  Fever/Chills  Chest pain/shortness of breath                   Additional instructions:   Great news, normal examination today - no blood, ulcer, cancer, or other problems. I want you to keep an eye on your stool and come back to the office in about 6 weeks to decide if additional GI tests are needed. It was an honor to be your doctor today.   Please let me or my office staff know if you have any feedback about today's procedure.     [place], MD

## 2021-04-26 ENCOUNTER — OFFICE VISIT (OUTPATIENT)
Dept: FAMILY MEDICINE CLINIC | Age: 81
End: 2021-04-26
Payer: MEDICARE

## 2021-04-26 VITALS
HEART RATE: 61 BPM | RESPIRATION RATE: 20 BRPM | SYSTOLIC BLOOD PRESSURE: 140 MMHG | DIASTOLIC BLOOD PRESSURE: 70 MMHG | OXYGEN SATURATION: 100 % | WEIGHT: 138 LBS | TEMPERATURE: 97.7 F | BODY MASS INDEX: 19.32 KG/M2 | HEIGHT: 71 IN

## 2021-04-26 DIAGNOSIS — I10 HYPERTENSION, UNSPECIFIED TYPE: Primary | ICD-10-CM

## 2021-04-26 DIAGNOSIS — K59.09 OTHER CONSTIPATION: ICD-10-CM

## 2021-04-26 DIAGNOSIS — Z11.59 ENCOUNTER FOR HEPATITIS C SCREENING TEST FOR LOW RISK PATIENT: ICD-10-CM

## 2021-04-26 DIAGNOSIS — D50.9 IRON DEFICIENCY ANEMIA, UNSPECIFIED IRON DEFICIENCY ANEMIA TYPE: ICD-10-CM

## 2021-04-26 PROCEDURE — G8536 NO DOC ELDER MAL SCRN: HCPCS | Performed by: NURSE PRACTITIONER

## 2021-04-26 PROCEDURE — G8427 DOCREV CUR MEDS BY ELIG CLIN: HCPCS | Performed by: NURSE PRACTITIONER

## 2021-04-26 PROCEDURE — 1101F PT FALLS ASSESS-DOCD LE1/YR: CPT | Performed by: NURSE PRACTITIONER

## 2021-04-26 PROCEDURE — G8754 DIAS BP LESS 90: HCPCS | Performed by: NURSE PRACTITIONER

## 2021-04-26 PROCEDURE — 99214 OFFICE O/P EST MOD 30 MIN: CPT | Performed by: NURSE PRACTITIONER

## 2021-04-26 PROCEDURE — G8753 SYS BP > OR = 140: HCPCS | Performed by: NURSE PRACTITIONER

## 2021-04-26 PROCEDURE — G8432 DEP SCR NOT DOC, RNG: HCPCS | Performed by: NURSE PRACTITIONER

## 2021-04-26 PROCEDURE — G8420 CALC BMI NORM PARAMETERS: HCPCS | Performed by: NURSE PRACTITIONER

## 2021-04-26 RX ORDER — CILOSTAZOL 50 MG/1
TABLET ORAL
COMMUNITY
Start: 2020-10-26

## 2021-04-26 RX ORDER — ZOSTER VACCINE RECOMBINANT, ADJUVANTED 50 MCG/0.5
0.5 KIT INTRAMUSCULAR ONCE
Qty: 0.5 ML | Refills: 0 | Status: SHIPPED | OUTPATIENT
Start: 2021-04-26 | End: 2021-04-26

## 2021-04-26 NOTE — PROGRESS NOTES
Losing weight, not wanting to eat, feeling fatigue, Pt states he does not feel right. Chief Complaint   Patient presents with   [facility] Follow Up     [facility]     1. Have you been to the ER, urgent care clinic since your last visit? Hospitalized since your last visit? Yes Where: [facility]    2. Have you seen or consulted any other health care providers outside of the [address] [place] [address] since your last visit? Include any pap smears or colon screening.  Yes Where: [facility]

## 2021-04-26 NOTE — PROGRESS NOTES
Subjective  Chief Complaint   Patient presents with   [facility] Follow Up     [facility]     HPI:  David Ordonez is a 68 y.o. male. 39 yo male presents for hospital f/u from [facility] for constipation. On the date of admission it was noted that the patient spent minutes on the toilet with extreme straining and pain. His pain resolved after he had the bowel movement. [facility] with being mildly anemia. In hospital patient went fo EGD which found on-bleeding AVM. He was advised to follow up with PCP for anemia. He was discharged with a level of 7.9 hgb. He was also given 2 units of PRBS and discharged on iron. Patient has appointments for f/u with hematology and cardiology    Past Medical History:   Diagnosis Date    Anemia 10/28/2020    Anxiety 10/28/2020    CAD (coronary artery disease) 1997    stents    Ill-defined condition     High cholesterole    Insomnia 10/28/2020    Low hemoglobin     Peripheral arterial occlusive disease ([address] 11/16/2020    Primary erectile dysfunction 10/28/2020    Vitamin D deficiency 10/28/2020     Family History   Problem Relation Age of Onset    Heart Disease Mother      Social History     Socioeconomic History    Marital status:      Spouse name: Not on file    Number of children: Not on file    Years of education: Not on file    Highest education level: Not on file   Occupational History    Not on file   Social Needs    Financial resource strain: Not on file    Food insecurity     Worry: Not on file     Inability: Not on file   [employer] needs     Medical: Not on file     Non-medical: Not on file   Tobacco Use    Smoking status: Current Every Day Smoker     Packs/day: 1.50     Years: 60.00     Pack years: 90.00    Smokeless tobacco: Never Used   Substance and Sexual Activity    Alcohol use:  Yes     Alcohol/week: 11.7 standard drinks     Types: 14 Shots of liquor per week     Frequency: 4 or more times a week     Binge frequency: Never    Drug use: No    Sexual activity: Not on file   Lifestyle    Physical activity     Days per week: Not on file     Minutes per session: Not on file    Stress: Not on file   Relationships    Social connections     Talks on phone: Not on file     Gets together: Not on file     Attends [employer] service: Not on file     Active member of club or organization: Not on file     Attends meetings of clubs or organizations: Not on file     Relationship status: Not on file    Intimate partner violence     Fear of current or ex partner: Not on file     Emotionally abused: Not on file     Physically abused: Not on file     Forced sexual activity: Not on file   Other Topics Concern    Not on file   Social History Narrative    Not on file     Current Outpatient Medications on File Prior to Visit   Medication Sig Dispense Refill    cilostazoL (PLETAL) 50 mg tablet TAKE ONE TABLET BY MOUTH TWICE A DAY; TAKE AN HALF HOUR BEFORE OR 2 HOURS AFTER BREAKFAST AND DINNER      oxybutynin (DITROPAN) 5 mg tablet TAKE ONE TABLET BY MOUTH THREE TIMES A DAY 90 Tab 1    rosuvastatin (CRESTOR) 40 mg tablet Take 40 mg by mouth nightly.  cholecalciferol (Vitamin D3) 25 mcg (1,000 unit) cap Take  by mouth daily.  calcium acetate,phosphat bind, (PHOSLO) 667 mg cap Take  by mouth three (3) times daily (with meals).  glucosamine-chondroitin (ARTHX) 500-400 mg cap Take 1 Cap by mouth daily.  ferrous sulfate (IRON) 325 mg (65 mg iron) tablet Take  by mouth Daily (before breakfast).  ALPRAZolam (XANAX) 0.25 mg tablet Take 0.5 mg by mouth nightly as needed for Anxiety.  clopidogrel (PLAVIX) 75 mg tablet Take 75 mg by mouth daily.  Iron BisGl &PS [id number]-[id number] 610-56-35-9 mg-mg-mcg-mg cap Take  by mouth.  sildenafil citrate (Viagra) 100 mg tablet Take 100 mg by mouth as needed for Erectile Dysfunction.        No current facility-administered medications on file prior to visit. No Known Allergies  ROS   ROS per HPI and PMH      Objective  Physical Exam  Vitals signs reviewed. Cardiovascular:      Rate and Rhythm: Normal rate and regular rhythm. Heart sounds: Normal heart sounds. Pulmonary:      Effort: Pulmonary effort is normal.      Breath sounds: Normal breath sounds. Abdominal:      General: Bowel sounds are normal.      Palpations: Abdomen is soft. Skin:     General: Skin is warm and dry. Neurological:      Mental Status: He is alert and oriented to person, place, and time. Psychiatric:         Mood and Affect: Mood normal.         Behavior: Behavior normal.         Thought Content: Thought content normal.          Assessment & Plan      ICD-10-CM ICD-9-CM    1. Hypertension, unspecified type  I10 401.9    2. Iron deficiency anemia, unspecified iron deficiency anemia type  D50.9 280.9 CBC WITH AUTOMATED DIFF   3. Encounter for hepatitis C screening test for low risk patient  Z11.59 V73.89 HEPATITIS C AB   4. Other constipation  K59.09 564.09      Diagnoses and all orders for this visit:    1. Hypertension, unspecified type  bp today is 140/70 on presentation. Will continue with present regimen    2. Iron deficiency anemia, unspecified iron deficiency anemia type  -     CBC WITH AUTOMATED DIFF  Obtaining updated CBC for trending and will make treatment/ referral decisions when I get the results    3. Encounter for hepatitis C screening test for low risk patient  -     HEPATITIS C AB  Screening for hep C and will confirm positive with additional lab testing and refer to GI for further evaluation and treatment    4. Other constipation  Patient instructed to drink plenty of water and to call the office with any further difficulties    Other orders  -     varicella-zoster recombinant, PF, (Shingrix, PF,) 50 mcg/0.5 mL susr injection; 0.5 mL by IntraMUSCular route once for 1 dose.       Follow-up and Dispositions    · Return in about 3 months (around 7/26/2021) for [address] with annual physical and fasting labs.        Beth Card NP

## 2021-04-27 LAB
BASOPHILS # BLD AUTO: 0.1 X10E3/UL (ref 0–0.2)
BASOPHILS NFR BLD AUTO: 1 %
EOSINOPHIL # BLD AUTO: 0.2 X10E3/UL (ref 0–0.4)
EOSINOPHIL NFR BLD AUTO: 4 %
ERYTHROCYTE [DISTWIDTH] IN BLOOD BY AUTOMATED COUNT: 14.4 % (ref 11.6–15.4)
HCT VFR BLD AUTO: 32.6 % (ref 37.5–51)
HCV AB S/CO SERPL IA: <0.1 S/CO RATIO (ref 0–0.9)
HGB BLD-MCNC: 10.4 G/DL (ref 13–17.7)
IMM GRANULOCYTES # BLD AUTO: 0 X10E3/UL (ref 0–0.1)
IMM GRANULOCYTES NFR BLD AUTO: 0 %
LYMPHOCYTES # BLD AUTO: 1.1 X10E3/UL (ref 0.7–3.1)
LYMPHOCYTES NFR BLD AUTO: 24 %
MCH RBC QN AUTO: 31.1 PG (ref 26.6–33)
MCHC RBC AUTO-ENTMCNC: 31.9 G/DL (ref 31.5–35.7)
MCV RBC AUTO: 98 FL (ref 79–97)
MONOCYTES # BLD AUTO: 0.4 X10E3/UL (ref 0.1–0.9)
MONOCYTES NFR BLD AUTO: 9 %
NEUTROPHILS # BLD AUTO: 2.7 X10E3/UL (ref 1.4–7)
NEUTROPHILS NFR BLD AUTO: 62 %
PLATELET # BLD AUTO: 282 X10E3/UL (ref 150–450)
RBC # BLD AUTO: 3.34 X10E6/UL (ref 4.14–5.8)
WBC # BLD AUTO: 4.5 X10E3/UL (ref 3.4–10.8)

## 2021-04-28 DIAGNOSIS — D50.8 IRON DEFICIENCY ANEMIA SECONDARY TO INADEQUATE DIETARY IRON INTAKE: Primary | ICD-10-CM

## 2021-04-28 NOTE — PROGRESS NOTES
You are anemic as we previously discussed and you did tell me you were on iron. I will need you to continue taking the iron and make an appointment for 3 months to recheck you anemia.   You do not need to see me just get the labs drawn

## 2021-04-30 PROBLEM — K59.09 OTHER CONSTIPATION: Status: ACTIVE | Noted: 2021-04-30

## 2021-04-30 RX ORDER — MAGNESIUM CITRATE
296 SOLUTION, ORAL ORAL
Qty: 296 ML | Refills: 0 | Status: SHIPPED | OUTPATIENT
Start: 2021-04-30 | End: 2021-04-30

## 2021-05-05 ENCOUNTER — PATIENT MESSAGE (OUTPATIENT)
Dept: FAMILY MEDICINE CLINIC | Age: 81
End: 2021-05-05

## 2021-05-05 NOTE — TELEPHONE ENCOUNTER
From: [facility]  To: Ximena Cooper NP  Sent: 5/5/2021 8:40 AM EDT  Subject: Non-Urgent Medical Question    I HAD BLOOD IN MY URINE THIS MORNING. MY CBC READINGS HAVE BEEN MOSTLY ABNORMAL.  WHAT DO I DO?

## 2021-05-10 ENCOUNTER — OFFICE VISIT (OUTPATIENT)
Dept: FAMILY MEDICINE CLINIC | Age: 81
End: 2021-05-10
Payer: MEDICARE

## 2021-05-10 VITALS
WEIGHT: 135 LBS | BODY MASS INDEX: 18.9 KG/M2 | OXYGEN SATURATION: 99 % | HEIGHT: 71 IN | DIASTOLIC BLOOD PRESSURE: 50 MMHG | TEMPERATURE: 96.9 F | SYSTOLIC BLOOD PRESSURE: 118 MMHG | RESPIRATION RATE: 16 BRPM | HEART RATE: 66 BPM

## 2021-05-10 DIAGNOSIS — I25.2 HISTORY OF MI (MYOCARDIAL INFARCTION): ICD-10-CM

## 2021-05-10 DIAGNOSIS — R35.0 URINARY FREQUENCY: ICD-10-CM

## 2021-05-10 DIAGNOSIS — I10 HYPERTENSION, UNSPECIFIED TYPE: Primary | ICD-10-CM

## 2021-05-10 DIAGNOSIS — I77.9 PERIPHERAL ARTERIAL OCCLUSIVE DISEASE (HCC): ICD-10-CM

## 2021-05-10 DIAGNOSIS — R63.4 UNINTENTIONAL WEIGHT LOSS: ICD-10-CM

## 2021-05-10 PROBLEM — K59.1 FUNCTIONAL DIARRHEA: Status: ACTIVE | Noted: 2021-05-10

## 2021-05-10 LAB
BACTERIA UA POCT, BACTPOCT: NORMAL
BILIRUB UR QL STRIP: NEGATIVE
CASTS UA POCT: NORMAL
CLUE CELLS, CLUEPOCT: NORMAL
CRYSTALS UA POCT, CRYSPOCT: NORMAL
EPITHELIAL CELLS POCT: NORMAL
GLUCOSE UR-MCNC: NEGATIVE MG/DL
KETONES P FAST UR STRIP-MCNC: NEGATIVE MG/DL
MUCUS UA POCT, MUCPOCT: NORMAL
PH UR STRIP: 7 [PH] (ref 4.6–8)
PROT UR QL STRIP: NEGATIVE
RBC UA POCT, RBCPOCT: NORMAL
SP GR UR STRIP: 1.02 (ref 1–1.03)
TRICH UA POCT, TRICHPOC: NORMAL
UA UROBILINOGEN AMB POC: NORMAL (ref 0.2–1)
URINALYSIS CLARITY POC: CLEAR
URINALYSIS COLOR POC: YELLOW
URINE BLOOD POC: NEGATIVE
URINE CULT COMMENT, POCT: NORMAL
URINE LEUKOCYTES POC: NEGATIVE
URINE NITRITES POC: NEGATIVE
WBC UA POCT, WBCPOCT: NORMAL
YEAST UA POCT, YEASTPOC: NORMAL

## 2021-05-10 PROCEDURE — G8432 DEP SCR NOT DOC, RNG: HCPCS | Performed by: NURSE PRACTITIONER

## 2021-05-10 PROCEDURE — G8420 CALC BMI NORM PARAMETERS: HCPCS | Performed by: NURSE PRACTITIONER

## 2021-05-10 PROCEDURE — 81003 URINALYSIS AUTO W/O SCOPE: CPT | Performed by: NURSE PRACTITIONER

## 2021-05-10 PROCEDURE — G8752 SYS BP LESS 140: HCPCS | Performed by: NURSE PRACTITIONER

## 2021-05-10 PROCEDURE — G8536 NO DOC ELDER MAL SCRN: HCPCS | Performed by: NURSE PRACTITIONER

## 2021-05-10 PROCEDURE — G8427 DOCREV CUR MEDS BY ELIG CLIN: HCPCS | Performed by: NURSE PRACTITIONER

## 2021-05-10 PROCEDURE — 99214 OFFICE O/P EST MOD 30 MIN: CPT | Performed by: NURSE PRACTITIONER

## 2021-05-10 PROCEDURE — 1101F PT FALLS ASSESS-DOCD LE1/YR: CPT | Performed by: NURSE PRACTITIONER

## 2021-05-10 PROCEDURE — G8754 DIAS BP LESS 90: HCPCS | Performed by: NURSE PRACTITIONER

## 2021-05-10 RX ORDER — METOPROLOL SUCCINATE AND HYDROCHLOROTHIAZIDE 25; 12.5 MG/1; MG/1
TABLET ORAL
COMMUNITY
Start: 2021-01-05 | End: 2021-07-28 | Stop reason: ALTCHOICE

## 2021-05-10 RX ORDER — FLUOXETINE HYDROCHLORIDE 20 MG/1
20 CAPSULE ORAL DAILY
COMMUNITY

## 2021-05-10 RX ORDER — TAMSULOSIN HYDROCHLORIDE 0.4 MG/1
0.4 CAPSULE ORAL DAILY
Qty: 90 CAP | Refills: 1 | Status: SHIPPED | OUTPATIENT
Start: 2021-05-10 | End: 2021-11-17

## 2021-05-10 RX ORDER — DIPHENOXYLATE HYDROCHLORIDE AND ATROPINE SULFATE 2.5; .025 MG/1; MG/1
TABLET ORAL
Qty: 90 TAB | Refills: 1 | Status: CANCELLED | OUTPATIENT
Start: 2021-05-10

## 2021-05-10 RX ORDER — ASPIRIN 81 MG/1
81 TABLET ORAL DAILY
COMMUNITY

## 2021-05-10 RX ORDER — ISOSORBIDE MONONITRATE 20 MG/1
20 TABLET ORAL 3 TIMES DAILY
COMMUNITY
Start: 2021-02-02

## 2021-05-10 NOTE — PROGRESS NOTES
Decreased appetite pt states hes always had this but has gotten worse, losing weight is concerning to pt. Chief Complaint   Patient presents with    Decreased Appetite     no appettite, started 3 days ago     1. Have you been to the ER, urgent care clinic since your last visit? Hospitalized since your last visit? No    2. Have you seen or consulted any other health care providers outside of the [address] [place] [address] since your last visit? Include any pap smears or colon screening.  No

## 2021-05-10 NOTE — PROGRESS NOTES
Subjective  Chief Complaint   Patient presents with    Decreased Appetite     no appettite, started 3 days ago     HPI:  Bailey Mendez is a 68 y.o. male. 69 yo male presents with complaints of urinary frequency for the last 2-3 days. He states that he is drinking more water. He also has concerns about weight loss. He is drinking 2 boosts a day along with his regular PO intake (yesterday he had eggs and sausage for breakfast and had applebees for dinner where he had chicken and shrimp.       Past Medical History:   Diagnosis Date    Anemia 10/28/2020    Anxiety 10/28/2020    CAD (coronary artery disease) 1997    stents    Ill-defined condition     High cholesterole    Insomnia 10/28/2020    Low hemoglobin     Peripheral arterial occlusive disease ([facility] [address] 11/16/2020    Primary erectile dysfunction 10/28/2020    Vitamin D deficiency 10/28/2020     Family History   Problem Relation Age of Onset    Heart Disease Mother      Social History     Socioeconomic History    Marital status:      Spouse name: Not on file    Number of children: Not on file    Years of education: Not on file    Highest education level: Not on file   Occupational History    Not on file   Social Needs    Financial resource strain: Not on file    Food insecurity     Worry: Not on file     Inability: Not on file    Transportation needs     Medical: Not on file     Non-medical: Not on file   Tobacco Use    Smoking status: Current Every Day Smoker     Packs/day: 1.50     Years: 60.00     Pack years: 90.00    Smokeless tobacco: Never Used   Substance and Sexual Activity    Alcohol use: Yes     Frequency: 4 or more times a week     Binge frequency: Never     Comment: 2 drinks a day    Drug use: No    Sexual activity: Not on file   Lifestyle    Physical activity     Days per week: Not on file     Minutes per session: Not on file    Stress: Not on file   Relationships    Social connections     Talks on phone: Not on file Gets together: Not on file     Attends Congregational service: Not on file     Active member of club or organization: Not on file     Attends meetings of clubs or organizations: Not on file     Relationship status: Not on file    Intimate partner violence     Fear of current or ex partner: Not on file     Emotionally abused: Not on file     Physically abused: Not on file     Forced sexual activity: Not on file   Other Topics Concern    Not on file   Social History Narrative    Not on file     Current Outpatient Medications on File Prior to Visit   Medication Sig Dispense Refill    isosorbide mononitrate (ISMO, MONOKET) 20 mg tablet       metoprolol rouse-hydrochlorothiaz 25-12.5 mg Tb24       aspirin delayed-release 81 mg tablet       FLUoxetine (PROzac) 20 mg capsule Take 20 mg by mouth daily.  cilostazoL (PLETAL) 50 mg tablet TAKE ONE TABLET BY MOUTH TWICE A DAY; TAKE AN HALF HOUR BEFORE OR 2 HOURS AFTER BREAKFAST AND DINNER      oxybutynin (DITROPAN) 5 mg tablet TAKE ONE TABLET BY MOUTH THREE TIMES A DAY 90 Tab 1    calcium acetate,phosphat bind, (PHOSLO) 667 mg cap Take  by mouth three (3) times daily (with meals).  glucosamine-chondroitin (ARTHX) 500-400 mg cap Take 1 Cap by mouth daily.  ferrous sulfate (IRON) 325 mg (65 mg iron) tablet Take  by mouth Daily (before breakfast).  clopidogrel (PLAVIX) 75 mg tablet Take 75 mg by mouth daily.  rosuvastatin (CRESTOR) 40 mg tablet Take 40 mg by mouth nightly.  Iron BisGl &PS [id number] 474-14-57-5 mg-mg-mcg-mg cap Take  by mouth.  cholecalciferol (Vitamin D3) 25 mcg (1,000 unit) cap Take  by mouth daily.  ALPRAZolam (XANAX) 0.25 mg tablet Take 0.5 mg by mouth nightly as needed for Anxiety. No current facility-administered medications on file prior to visit. No Known Allergies  ROS   ROS per HPI and PMH      Objective  Physical Exam  Vitals signs reviewed.    Abdominal:      General: Bowel sounds are normal. Palpations: Abdomen is soft. Neurological:      Mental Status: He is alert and oriented to person, place, and time. Psychiatric:         Mood and Affect: Mood normal.         Behavior: Behavior normal.         Thought Content: Thought content normal.         Judgment: Judgment normal.          Assessment & Plan      ICD-10-CM ICD-9-CM    1. Hypertension, unspecified type  I10 401.9    2. Peripheral arterial occlusive disease (HCC)  I77.9 444.22    3. History of MI (myocardial infarction)  I25.2 412    4. Urinary frequency  R35.0 788.41 AMB POC URINALYSIS DIP STICK AUTO W/ MICRO    5. Unintentional weight loss  R63.4 783.21      Diagnoses and all orders for this visit:    1. Hypertension, unspecified type  BP is at goal today at 118/50. Patient is followed by cardiology for this diagnosis     2. Peripheral arterial occlusive disease ([facility] [address]  Patient is followed by cardiology for this diagnosis       3. History of MI (myocardial infarction)  Patient is followed by cardiology for this diagnosis       4. Urinary frequency  -     AMB POC URINALYSIS DIP STICK AUTO W/ MICRO   Will see if flomax will help with this complaint. Patient is to call the office after 10 days to let me now if it works    5. Unintentional weight loss  Patient instructed to try to increase his boost intake to 3 times a day along with his usual 2 meals a day and to keep f/u virtual visit with GI. They have done endoscopy and colonoscopy and no pathology was found    Other orders  -     tamsulosin (Flomax) 0.4 mg capsule; Take 1 Cap by mouth daily.         Chelsey Reynolds, NP

## 2021-05-19 ENCOUNTER — PATIENT MESSAGE (OUTPATIENT)
Dept: FAMILY MEDICINE CLINIC | Age: 81
End: 2021-05-19

## 2021-05-19 DIAGNOSIS — F41.9 ANXIETY: Primary | ICD-10-CM

## 2021-05-20 NOTE — TELEPHONE ENCOUNTER
From: Robles Chu To: Anabelle Castellanos NP Sent: 5/19/2021 8:21 PM EDT Subject: Prescription Question I need a prescription for alprozalam. I have been taking this medication for years. My heart doctor prescribed these in the past. 75 pills . 5 mg up to 3 a day as needed. They told me I had to get these from my family doctor. I take 2 each night to help me sleep. Can you send prescription to [facility] on [address]?

## 2021-05-21 RX ORDER — ALPRAZOLAM 0.25 MG/1
TABLET ORAL
Qty: 75 TABLET | Refills: 0 | Status: SHIPPED | OUTPATIENT
Start: 2021-05-21 | End: 2021-06-29 | Stop reason: SDUPTHER

## 2021-06-03 RX ORDER — OXYBUTYNIN CHLORIDE 5 MG/1
5 TABLET ORAL 3 TIMES DAILY
Qty: 270 TABLET | Refills: 1 | Status: SHIPPED | OUTPATIENT
Start: 2021-06-03 | End: 2021-07-28 | Stop reason: ALTCHOICE

## 2021-06-03 RX ORDER — OXYBUTYNIN CHLORIDE 5 MG/1
TABLET ORAL
Qty: 90 TABLET | Refills: 0 | Status: SHIPPED | OUTPATIENT
Start: 2021-06-03 | End: 2021-08-25 | Stop reason: ALTCHOICE

## 2021-06-29 DIAGNOSIS — F41.9 ANXIETY: ICD-10-CM

## 2021-06-30 RX ORDER — ALPRAZOLAM 0.25 MG/1
TABLET ORAL
Qty: 75 TABLET | Refills: 0 | Status: SHIPPED | OUTPATIENT
Start: 2021-06-30 | End: 2021-08-16

## 2021-07-28 ENCOUNTER — OFFICE VISIT (OUTPATIENT)
Dept: FAMILY MEDICINE CLINIC | Age: 81
End: 2021-07-28
Payer: MEDICARE

## 2021-07-28 VITALS
HEART RATE: 76 BPM | TEMPERATURE: 98.3 F | DIASTOLIC BLOOD PRESSURE: 58 MMHG | OXYGEN SATURATION: 97 % | WEIGHT: 143 LBS | SYSTOLIC BLOOD PRESSURE: 110 MMHG | BODY MASS INDEX: 20.02 KG/M2 | HEIGHT: 71 IN

## 2021-07-28 DIAGNOSIS — Z13.0 SCREENING FOR DEFICIENCY ANEMIA: ICD-10-CM

## 2021-07-28 DIAGNOSIS — E78.5 HYPERLIPIDEMIA, UNSPECIFIED HYPERLIPIDEMIA TYPE: ICD-10-CM

## 2021-07-28 DIAGNOSIS — C44.310 BASAL CELL CARCINOMA (BCC) OF SKIN OF FACE, UNSPECIFIED PART OF FACE: ICD-10-CM

## 2021-07-28 DIAGNOSIS — R63.0 DECREASED APPETITE: ICD-10-CM

## 2021-07-28 DIAGNOSIS — Z01.818 PRE-OPERATIVE EXAM: Primary | ICD-10-CM

## 2021-07-28 DIAGNOSIS — I10 HYPERTENSION, UNSPECIFIED TYPE: ICD-10-CM

## 2021-07-28 PROCEDURE — G8752 SYS BP LESS 140: HCPCS | Performed by: NURSE PRACTITIONER

## 2021-07-28 PROCEDURE — G8427 DOCREV CUR MEDS BY ELIG CLIN: HCPCS | Performed by: NURSE PRACTITIONER

## 2021-07-28 PROCEDURE — G8536 NO DOC ELDER MAL SCRN: HCPCS | Performed by: NURSE PRACTITIONER

## 2021-07-28 PROCEDURE — 1101F PT FALLS ASSESS-DOCD LE1/YR: CPT | Performed by: NURSE PRACTITIONER

## 2021-07-28 PROCEDURE — 99215 OFFICE O/P EST HI 40 MIN: CPT | Performed by: NURSE PRACTITIONER

## 2021-07-28 PROCEDURE — G8420 CALC BMI NORM PARAMETERS: HCPCS | Performed by: NURSE PRACTITIONER

## 2021-07-28 PROCEDURE — G8432 DEP SCR NOT DOC, RNG: HCPCS | Performed by: NURSE PRACTITIONER

## 2021-07-28 PROCEDURE — G8754 DIAS BP LESS 90: HCPCS | Performed by: NURSE PRACTITIONER

## 2021-07-28 RX ORDER — DRONABINOL 5 MG/1
5 CAPSULE ORAL 2 TIMES DAILY
Qty: 180 CAPSULE | Refills: 1 | Status: SHIPPED | OUTPATIENT
Start: 2021-07-28 | End: 2021-09-09 | Stop reason: ALTCHOICE

## 2021-07-28 RX ORDER — METOPROLOL SUCCINATE 25 MG/1
0.5 TABLET, EXTENDED RELEASE ORAL DAILY
COMMUNITY
Start: 2021-01-05

## 2021-07-28 NOTE — PROGRESS NOTES
Subjective  Chief Complaint   Patient presents with    Follow Up Chronic Condition    Pre-op Exam     Eye surgery to [facility] on 8/10/21     HPI:  Femi Lewis is a 68 y.o. male. 69 yo male presents for pre-op for basal cell carcinoma discovered by dermatology in the lower lid of his right eye at [address] [place] [address]. Patient then has a follow up appointment with them for f/u scheduled already. This surgerey is under general anesthesia. He has already been cleared by his cardiologist and has instructions from him for discontinuation of meds and restarting meds. Patient also is concerned about his weight loss and is upset that he no longer enjoys eating. Yesterday he had a sausage, egg and cheese Mcmuffin and pork loin and potatoes for dinner and would be interested in anything that would help this.     Past Medical History:   Diagnosis Date    Anemia 10/28/2020    Anxiety 10/28/2020    CAD (coronary artery disease) 1997    stents    Ill-defined condition     High cholesterole    Insomnia 10/28/2020    Low hemoglobin     Peripheral arterial occlusive disease ([address] 11/16/2020    Primary erectile dysfunction 10/28/2020    Vitamin D deficiency 10/28/2020     Family History   Problem Relation Age of Onset    Heart Disease Mother      Social History     Socioeconomic History    Marital status:      Spouse name: Not on file    Number of children: Not on file    Years of education: Not on file    Highest education level: Not on file   Occupational History    Not on file   Tobacco Use    Smoking status: Current Every Day Smoker     Packs/day: 1.50     Years: 60.00     Pack years: 90.00    Smokeless tobacco: Never Used   Vaping Use    Vaping Use: Never used   Substance and Sexual Activity    Alcohol use: Yes     Comment: 2 drinks a day    Drug use: No    Sexual activity: Not on file   Other Topics Concern    Not on file   Social History Narrative    Not on file     Social Determinants of Health     Financial Resource Strain:     Difficulty of Paying Living Expenses:    Food Insecurity:     Worried About [address] [facility] in the Last Year:     [address] [facility] [address] in the Last Year:    Transportation Needs:     Lack of Transportation (Medical):  Lack of Transportation (Non-Medical):    Physical Activity:     Days of Exercise per Week:     Minutes of Exercise per Session:    Stress:     Feeling of Stress :    Social Connections:     Frequency of Communication with Friends and Family:     Frequency of Social Gatherings with Friends and Family:     Attends [employer] Services:     Active Member of Clubs or Organizations:     Attends Club or Organization Meetings:     Marital Status:    Intimate Partner Violence:     Fear of Current or Ex-Partner:     Emotionally Abused:     Physically Abused:     Sexually Abused:      Current Outpatient Medications on File Prior to Visit   Medication Sig Dispense Refill    metoprolol succinate (TOPROL-XL) 25 mg XL tablet Take 0.5 Tablets by mouth daily.  ALPRAZolam (XANAX) 0.25 mg tablet Take one tablet by mouth 3 times a day as needed 75 Tablet 0    oxybutynin (DITROPAN) 5 mg tablet TAKE ONE TABLET BY MOUTH THREE TIMES A DAY 90 Tablet 0    isosorbide mononitrate (ISMO, MONOKET) 20 mg tablet Take 20 mg by mouth three (3) times daily.  aspirin delayed-release 81 mg tablet Take 81 mg by mouth daily.  FLUoxetine (PROzac) 20 mg capsule Take 20 mg by mouth daily.  tamsulosin (Flomax) 0.4 mg capsule Take 1 Cap by mouth daily. 90 Cap 1    cilostazoL (PLETAL) 50 mg tablet TAKE ONE TABLET BY MOUTH TWICE A DAY; TAKE AN HALF HOUR BEFORE OR 2 HOURS AFTER BREAKFAST AND DINNER      calcium acetate,phosphat bind, (PHOSLO) 667 mg cap Take  by mouth three (3) times daily (with meals).  glucosamine-chondroitin (ARTHX) 500-400 mg cap Take 1 Cap by mouth daily.       ferrous sulfate (IRON) 325 mg (65 mg iron) tablet Take 325 mg by mouth Daily (before breakfast).  clopidogrel (PLAVIX) 75 mg tablet Take 75 mg by mouth daily.  [DISCONTINUED] oxybutynin (DITROPAN) 5 mg tablet Take 1 Tablet by mouth three (3) times daily. 270 Tablet 1    [DISCONTINUED] metoprolol rouse-hydrochlorothiaz 25-12.5 mg Tb24       [DISCONTINUED] Iron BisGl &PS [id number] 971-38-04-9 mg-mg-mcg-mg cap Take  by mouth.  cholecalciferol (Vitamin D3) 25 mcg (1,000 unit) cap Take  by mouth daily. (Patient not taking: Reported on 7/28/2021)       No current facility-administered medications on file prior to visit. No Known Allergies  ROS   ROS per HPI and PMH      Objective  Physical Exam  Vitals reviewed. HENT:      Head: Normocephalic. Cardiovascular:      Rate and Rhythm: Normal rate and regular rhythm. Heart sounds: Normal heart sounds. Pulmonary:      Effort: Pulmonary effort is normal.      Breath sounds: Normal breath sounds. Abdominal:      General: Bowel sounds are normal.      Palpations: Abdomen is soft. Skin:     General: Skin is dry. Neurological:      Mental Status: He is alert and oriented to person, place, and time. Psychiatric:         Mood and Affect: Mood normal.         Behavior: Behavior normal.         Thought Content: Thought content normal.         Judgment: Judgment normal.          Assessment & Plan      ICD-10-CM ICD-9-CM    1. Pre-operative exam  Z01.818 V72.84    2. Basal cell carcinoma (BCC) of skin of face, unspecified part of face  C44.310 173.31    3. Hypertension, unspecified type  I10 401.9 CBC WITH AUTOMATED DIFF      LIPID PANEL   4. Hyperlipidemia, unspecified hyperlipidemia type  E78.5 272.4    5. Screening for deficiency anemia  Z13.0 V78.1    6. Decreased appetite  R63.0 783.0 dronabinoL (MARINOL) 5 mg capsule     Diagnoses and all orders for this visit:    1. Pre-operative exam  I am clearing patient for his surgery on [id number]    2.  Basal cell carcinoma (BCC) of skin of face, unspecified part of face  Patient being seen by [facility] for removal of the [facility]    3. Hypertension, unspecified type  -     CBC WITH AUTOMATED DIFF  -     LIPID PANEL  Obtaining updated CBC and lipid for trending and will make treatment decisions when I get the results    4. Hyperlipidemia, unspecified hyperlipidemia type  Obtaining updated CBC and lipid for trending and will make treatment decisions when I get the results      5. Screening for deficiency anemia  Obtaining updated CBC and lipid for trending and will make treatment decisions when I get the results      6. Decreased appetite  -     dronabinoL (MARINOL) 5 mg capsule; Take 1 Capsule by mouth two (2) times a day. Max Daily Amount: 10 mg. Will try dronabinol to see if we can restore patient's appetitie       Follow-up and Dispositions    · Return in about 4 months (around 11/28/2021) for [address] [place] [address] with annual physical and fasting labs.        Rosario Yee NP

## 2021-07-28 NOTE — PROGRESS NOTES
Chief Complaint   Patient presents with    Follow Up Chronic Condition    Pre-op Exam     Eye surgery to removed [address] [employer] on 8/10/21     1. Have you been to the ER, urgent care clinic since your last visit? Hospitalized since your last visit? No    2. Have you seen or consulted any other health care providers outside of the [address] [place] [address] since your last visit? Include any pap smears or colon screening. Yes Saw cardiology and eye doctor prior for routine visit.

## 2021-07-29 LAB
BASOPHILS # BLD AUTO: 0 X10E3/UL (ref 0–0.2)
BASOPHILS NFR BLD AUTO: 1 %
CHOLEST SERPL-MCNC: 148 MG/DL (ref 100–199)
EOSINOPHIL # BLD AUTO: 0.1 X10E3/UL (ref 0–0.4)
EOSINOPHIL NFR BLD AUTO: 3 %
ERYTHROCYTE [DISTWIDTH] IN BLOOD BY AUTOMATED COUNT: 12.5 % (ref 11.6–15.4)
HCT VFR BLD AUTO: 37.7 % (ref 37.5–51)
HDLC SERPL-MCNC: 55 MG/DL
HGB BLD-MCNC: 12.8 G/DL (ref 13–17.7)
IMM GRANULOCYTES # BLD AUTO: 0 X10E3/UL (ref 0–0.1)
IMM GRANULOCYTES NFR BLD AUTO: 1 %
LDLC SERPL CALC-MCNC: 73 MG/DL (ref 0–99)
LYMPHOCYTES # BLD AUTO: 0.9 X10E3/UL (ref 0.7–3.1)
LYMPHOCYTES NFR BLD AUTO: 21 %
MCH RBC QN AUTO: 33.2 PG (ref 26.6–33)
MCHC RBC AUTO-ENTMCNC: 34 G/DL (ref 31.5–35.7)
MCV RBC AUTO: 98 FL (ref 79–97)
MONOCYTES # BLD AUTO: 0.5 X10E3/UL (ref 0.1–0.9)
MONOCYTES NFR BLD AUTO: 10 %
NEUTROPHILS # BLD AUTO: 2.8 X10E3/UL (ref 1.4–7)
NEUTROPHILS NFR BLD AUTO: 64 %
PLATELET # BLD AUTO: 183 X10E3/UL (ref 150–450)
RBC # BLD AUTO: 3.85 X10E6/UL (ref 4.14–5.8)
TRIGL SERPL-MCNC: 113 MG/DL (ref 0–149)
VLDLC SERPL CALC-MCNC: 20 MG/DL (ref 5–40)
WBC # BLD AUTO: 4.4 X10E3/UL (ref 3.4–10.8)

## 2021-07-30 NOTE — PROGRESS NOTES
Your labs are virtually unchanged from your last set of labs so I am clearing you for your surgery and will send the form in to [facility] letting them know that I am clearing you

## 2021-08-16 DIAGNOSIS — F41.9 ANXIETY: ICD-10-CM

## 2021-08-16 RX ORDER — ALPRAZOLAM 0.25 MG/1
TABLET ORAL
Qty: 75 TABLET | Refills: 0 | Status: SHIPPED | OUTPATIENT
Start: 2021-08-16 | End: 2021-09-16 | Stop reason: SDUPTHER

## 2021-08-25 RX ORDER — OXYBUTYNIN CHLORIDE 10 MG/1
10 TABLET, EXTENDED RELEASE ORAL DAILY
Qty: 90 TABLET | Refills: 0 | Status: SHIPPED | OUTPATIENT
Start: 2021-08-25 | End: 2022-01-28 | Stop reason: SDUPTHER

## 2021-08-27 PROBLEM — Z01.818 PRE-OPERATIVE EXAM: Status: RESOLVED | Noted: 2021-07-28 | Resolved: 2021-08-27

## 2021-08-27 PROBLEM — Z13.0 SCREENING FOR DEFICIENCY ANEMIA: Status: RESOLVED | Noted: 2021-07-28 | Resolved: 2021-08-27

## 2021-09-09 RX ORDER — MEGESTROL ACETATE 40 MG/1
40 TABLET ORAL DAILY
Qty: 90 TABLET | Refills: 1 | Status: SHIPPED | OUTPATIENT
Start: 2021-09-09

## 2021-09-16 DIAGNOSIS — F41.9 ANXIETY: ICD-10-CM

## 2021-09-16 RX ORDER — ALPRAZOLAM 0.25 MG/1
TABLET ORAL
Qty: 75 TABLET | Refills: 0 | Status: SHIPPED | OUTPATIENT
Start: 2021-09-16 | End: 2021-10-20 | Stop reason: SDUPTHER

## 2021-09-26 DIAGNOSIS — R63.0 DECREASED APPETITE: Primary | ICD-10-CM

## 2021-10-20 DIAGNOSIS — F41.9 ANXIETY: ICD-10-CM

## 2021-10-21 RX ORDER — ALPRAZOLAM 0.25 MG/1
TABLET ORAL
Qty: 75 TABLET | Refills: 0 | Status: SHIPPED | OUTPATIENT
Start: 2021-10-21 | End: 2021-11-27

## 2021-11-17 RX ORDER — TAMSULOSIN HYDROCHLORIDE 0.4 MG/1
CAPSULE ORAL
Qty: 90 CAPSULE | Refills: 1 | Status: SHIPPED | OUTPATIENT
Start: 2021-11-17 | End: 2022-05-24 | Stop reason: SDUPTHER

## 2021-11-26 DIAGNOSIS — F41.9 ANXIETY: ICD-10-CM

## 2021-11-27 RX ORDER — ALPRAZOLAM 0.25 MG/1
TABLET ORAL
Qty: 75 TABLET | Refills: 0 | Status: SHIPPED | OUTPATIENT
Start: 2021-11-27 | End: 2022-01-07 | Stop reason: SDUPTHER

## 2021-12-01 ENCOUNTER — OFFICE VISIT (OUTPATIENT)
Dept: FAMILY MEDICINE CLINIC | Age: 81
End: 2021-12-01
Payer: MEDICARE

## 2021-12-01 VITALS
OXYGEN SATURATION: 99 % | HEART RATE: 73 BPM | SYSTOLIC BLOOD PRESSURE: 110 MMHG | BODY MASS INDEX: 20.97 KG/M2 | DIASTOLIC BLOOD PRESSURE: 58 MMHG | RESPIRATION RATE: 16 BRPM | HEIGHT: 71 IN | WEIGHT: 149.8 LBS | TEMPERATURE: 97.1 F

## 2021-12-01 DIAGNOSIS — K59.09 OTHER CONSTIPATION: ICD-10-CM

## 2021-12-01 DIAGNOSIS — Z00.00 MEDICARE ANNUAL WELLNESS VISIT, SUBSEQUENT: ICD-10-CM

## 2021-12-01 DIAGNOSIS — E78.5 HYPERLIPIDEMIA, UNSPECIFIED HYPERLIPIDEMIA TYPE: ICD-10-CM

## 2021-12-01 DIAGNOSIS — E55.9 VITAMIN D DEFICIENCY: ICD-10-CM

## 2021-12-01 DIAGNOSIS — R94.4 DECREASED GFR: ICD-10-CM

## 2021-12-01 DIAGNOSIS — F41.9 ANXIETY: ICD-10-CM

## 2021-12-01 DIAGNOSIS — F51.01 PRIMARY INSOMNIA: ICD-10-CM

## 2021-12-01 DIAGNOSIS — Z71.6 ENCOUNTER FOR SMOKING CESSATION COUNSELING: ICD-10-CM

## 2021-12-01 DIAGNOSIS — R63.0 DECREASED APPETITE: ICD-10-CM

## 2021-12-01 DIAGNOSIS — R63.4 UNINTENTIONAL WEIGHT LOSS: ICD-10-CM

## 2021-12-01 DIAGNOSIS — D50.8 IRON DEFICIENCY ANEMIA SECONDARY TO INADEQUATE DIETARY IRON INTAKE: ICD-10-CM

## 2021-12-01 DIAGNOSIS — K59.1 FUNCTIONAL DIARRHEA: ICD-10-CM

## 2021-12-01 DIAGNOSIS — I10 HYPERTENSION, UNSPECIFIED TYPE: Primary | ICD-10-CM

## 2021-12-01 PROCEDURE — G0439 PPPS, SUBSEQ VISIT: HCPCS | Performed by: NURSE PRACTITIONER

## 2021-12-01 PROCEDURE — G8427 DOCREV CUR MEDS BY ELIG CLIN: HCPCS | Performed by: NURSE PRACTITIONER

## 2021-12-01 PROCEDURE — G8432 DEP SCR NOT DOC, RNG: HCPCS | Performed by: NURSE PRACTITIONER

## 2021-12-01 PROCEDURE — G8536 NO DOC ELDER MAL SCRN: HCPCS | Performed by: NURSE PRACTITIONER

## 2021-12-01 PROCEDURE — G8752 SYS BP LESS 140: HCPCS | Performed by: NURSE PRACTITIONER

## 2021-12-01 PROCEDURE — 99213 OFFICE O/P EST LOW 20 MIN: CPT | Performed by: NURSE PRACTITIONER

## 2021-12-01 PROCEDURE — G8754 DIAS BP LESS 90: HCPCS | Performed by: NURSE PRACTITIONER

## 2021-12-01 PROCEDURE — 1101F PT FALLS ASSESS-DOCD LE1/YR: CPT | Performed by: NURSE PRACTITIONER

## 2021-12-01 PROCEDURE — G8420 CALC BMI NORM PARAMETERS: HCPCS | Performed by: NURSE PRACTITIONER

## 2021-12-01 RX ORDER — MIRTAZAPINE 15 MG/1
15 TABLET, FILM COATED ORAL
Qty: 90 TABLET | Refills: 1 | Status: SHIPPED | OUTPATIENT
Start: 2021-12-01 | End: 2022-06-20

## 2021-12-01 NOTE — PROGRESS NOTES
Chief Complaint   Patient presents with   [facility] Annual Wellness Visit     MEDICARE    Labs     1. Have you been to the ER, urgent care clinic since your last visit? Hospitalized since your last visit? No    2. Have you seen or consulted any other health care providers outside of the [address] [place] [address] since your last visit? Include any pap smears or colon screening. No   Visit Vitals  BP (!) 110/58 (BP 1 Location: Left upper arm, BP Patient Position: Sitting, BP Cuff Size: Adult)   Pulse 73   Temp 97.1 °F (36.2 °C) (Temporal)   Resp 16   Ht 5' 11\" (1.803 m)   Wt 149 lb 12.8 oz (67.9 kg)   SpO2 99%   BMI 20.89 kg/m²     3 most recent PHQ Screens 12/1/2021   Little interest or pleasure in doing things Not at all   Feeling down, depressed, irritable, or hopeless More than half the days   Total Score PHQ 2 2     Fall Risk Assessment, last 12 mths 12/1/2021   Able to walk? Yes   Fall in past 12 months? 0   Do you feel unsteady?  0   Are you worried about falling 0

## 2021-12-01 NOTE — PROGRESS NOTES
Medicare Wellness Exam:    Chief Complaint   Patient presents with    Annual Wellness Visit     [address]     he is a 68y.o. year old male who presents for evaluation for their Medicare Wellness Visit. His health screenings are as documented in the EMR. He is declining flu and shingrix vaccine  His biggest complaint is decreased appetite and weight loss and several meds have been tried and patient was referred to GI,  His weight at his last 2 visits has been stable. He has a desire to try remeron to see if that helps. I have asked him to drink boost 3 x a day. He has a family member that it has helped. He is followed by dermatology q 6 months secondary to his history of basal cell carcinoma. He has a  History of smoking and continues to be a current every day smoker for 15 years. Fall Screen is completed and assessed=yes  Depression Screen is completed and assessed=yes  Medication list reviewed and adjusted for accuracy=yes  Immunizations reviewed and updated=yes  Health/Preventative Screenings reviewed and updated=yes  ADL Functions reviewed=yes  See scanned medicare wellness documents for full details. Patient Active Problem List    Diagnosis    Iron deficiency anemia secondary to inadequate dietary iron intake    Decreased GFR    Decreased appetite    Encounter for smoking cessation counseling    Unintentional weight loss    Other constipation    Anemia    Anxiety    Insomnia    Primary erectile dysfunction    Vitamin D deficiency    Impingement syndrome of left shoulder    Sprain of left rotator cuff capsule    History of MI (myocardial infarction)    History of peripheral vascular disease    Hyperlipemia    Bilateral carotid bruits    HTN (hypertension)    CAD (coronary artery disease)       Reviewed PmHx, RxHx, FmHx, SocHx, AllgHx and updated and dated in the chart.     ROS   ROS per HPI and PMH    Objective:     Vitals:    12/01/21 1050   BP: (!) 110/58   Pulse: 73 Resp: 16   Temp: 97.1 °F (36.2 °C)   TempSrc: Temporal   SpO2: 99%   Weight: 149 lb 12.8 oz (67.9 kg)   Height: 5' 11\" (1.803 m)     Physical Exam  Vitals reviewed. HENT:      Head: Normocephalic. Abdominal:      General: Bowel sounds are normal.      Palpations: Abdomen is soft. Skin:     General: Skin is warm and dry. Neurological:      Mental Status: He is oriented to person, place, and time. Psychiatric:         Mood and Affect: Mood normal.         Behavior: Behavior normal.         Thought Content: Thought content normal.         Judgment: Judgment normal.          Assessment/ Plan:   Diagnoses and all orders for this visit:    1. Hypertension, unspecified type  -     CBC WITH AUTOMATED DIFF  -     METABOLIC PANEL, COMPREHENSIVE  Obtaining updated CBC and CMP for trending and will make treatment decisions when I get the results    2. Hyperlipidemia, unspecified hyperlipidemia type  -     LIPID PANEL  Obtaining updated lipid for trending and will make treatment decisions when I get the results    3. Vitamin D deficiency  -     VITAMIN D, 25 HYDROXY  Obtaining updated Vitamin D for trending and will make supplementation decisions when I get the results    4. Functional diarrhea  Patient uses miralax for this condition and feels that it adequately treats this condition. Will continue with the use of this medication    5. Iron deficiency anemia secondary to inadequate dietary iron intake  Obtaining updated CBC for trending and will make treatment decisions when I get the results    6. Anxiety  Patient is on xanax. I will see if the use of Remeron helps with efforts to decrease this medication    7. Decreased appetite  We are going to try remeron for decreased appetite and patient will let me know if this works    8. Decreased GFR  Obtaining updated CMP and will make treatment/referral decisions when I get the results    9.  Primary insomnia  Patient is going to try melatonin for this condition and will let me know if it works    8. Other constipation  Patient uses miralax and feels that this medication works well. Will continue with the use of this medication    11. Medicare annual wellness visit, subsequent  We are making sure that his health screenings are done in a timely fashion and they are as documented in the EMR    12. Unintentional weight loss  Patient is drinking 3 boosts a day and we will try remeron at patient's request    13. Encounter for smoking cessation counseling  Patient will let me know when he has a quit date    Other orders  -     mirtazapine (REMERON) 15 mg tablet; Take 1 Tablet by mouth nightly.         -Pain evaluation performed in office  -Cognitive Screen performed in office  -Depression Screen, Fall risks (by up and go test)  and ADL functionality were addressed  -Medication list updated and reviewed for any changes   -A comprehensive review of medical issues and a plan was formulated  -End of life planning was addressed with pt   -Health Screenings for preventions were addressed and a plan was formulated  -Shingles Vaccine was recommended  -Discussed with patient cancer risk factors and appropriate screenings for age  -Patient evaluated for colonoscopy and referred if needed per screeing criteria  -Labs from previous visits were discussed with patient   -Discussed with patient diet and exercise and formulated a plan as needed  -An Advanced care plan was developed with the patient.  -Alcohol screening performed and was negative    -  Follow-up and Dispositions    · Return in about 6 months (around 6/1/2022) for f/u of chronic conditions with fasting labs. I have discussed the diagnosis with the patient and the intended plan as seen in the above orders. The patient understands and agrees with the plan. The patient has received an after-visit summary and questions were answered concerning future plans.      Medication Side Effects and Warnings were discussed with lamont  Patient Labs were reviewed and or requested  Patient Past Records were reviewed and or requested    There are no Patient Instructions on file for this visit.       Richar Ellis NP

## 2021-12-02 LAB
25(OH)D3+25(OH)D2 SERPL-MCNC: 58.5 NG/ML (ref 30–100)
ALBUMIN SERPL-MCNC: 4.3 G/DL (ref 3.7–4.7)
ALBUMIN/GLOB SERPL: 2.2 {RATIO} (ref 1.2–2.2)
ALP SERPL-CCNC: 94 IU/L (ref 44–121)
ALT SERPL-CCNC: 10 IU/L (ref 0–44)
AST SERPL-CCNC: 15 IU/L (ref 0–40)
BASOPHILS # BLD AUTO: 0 X10E3/UL (ref 0–0.2)
BASOPHILS NFR BLD AUTO: 1 %
BILIRUB SERPL-MCNC: 0.3 MG/DL (ref 0–1.2)
BUN SERPL-MCNC: 19 MG/DL (ref 8–27)
BUN/CREAT SERPL: 15 (ref 10–24)
CALCIUM SERPL-MCNC: 10 MG/DL (ref 8.6–10.2)
CHLORIDE SERPL-SCNC: 104 MMOL/L (ref 96–106)
CHOLEST SERPL-MCNC: 159 MG/DL (ref 100–199)
CO2 SERPL-SCNC: 27 MMOL/L (ref 20–29)
CREAT SERPL-MCNC: 1.25 MG/DL (ref 0.76–1.27)
EOSINOPHIL # BLD AUTO: 0.2 X10E3/UL (ref 0–0.4)
EOSINOPHIL NFR BLD AUTO: 6 %
ERYTHROCYTE [DISTWIDTH] IN BLOOD BY AUTOMATED COUNT: 12.1 % (ref 11.6–15.4)
GLOBULIN SER CALC-MCNC: 2 G/DL (ref 1.5–4.5)
GLUCOSE SERPL-MCNC: 123 MG/DL (ref 65–99)
HCT VFR BLD AUTO: 34.2 % (ref 37.5–51)
HDLC SERPL-MCNC: 59 MG/DL
HGB BLD-MCNC: 12 G/DL (ref 13–17.7)
IMM GRANULOCYTES # BLD AUTO: 0 X10E3/UL (ref 0–0.1)
IMM GRANULOCYTES NFR BLD AUTO: 0 %
LDLC SERPL CALC-MCNC: 83 MG/DL (ref 0–99)
LYMPHOCYTES # BLD AUTO: 1 X10E3/UL (ref 0.7–3.1)
LYMPHOCYTES NFR BLD AUTO: 26 %
MCH RBC QN AUTO: 35.5 PG (ref 26.6–33)
MCHC RBC AUTO-ENTMCNC: 35.1 G/DL (ref 31.5–35.7)
MCV RBC AUTO: 101 FL (ref 79–97)
MONOCYTES # BLD AUTO: 0.4 X10E3/UL (ref 0.1–0.9)
MONOCYTES NFR BLD AUTO: 11 %
NEUTROPHILS # BLD AUTO: 2.1 X10E3/UL (ref 1.4–7)
NEUTROPHILS NFR BLD AUTO: 56 %
PLATELET # BLD AUTO: 180 X10E3/UL (ref 150–450)
POTASSIUM SERPL-SCNC: 4.7 MMOL/L (ref 3.5–5.2)
PROT SERPL-MCNC: 6.3 G/DL (ref 6–8.5)
RBC # BLD AUTO: 3.38 X10E6/UL (ref 4.14–5.8)
SODIUM SERPL-SCNC: 143 MMOL/L (ref 134–144)
TRIGL SERPL-MCNC: 95 MG/DL (ref 0–149)
VLDLC SERPL CALC-MCNC: 17 MG/DL (ref 5–40)
WBC # BLD AUTO: 3.7 X10E3/UL (ref 3.4–10.8)

## 2021-12-06 PROBLEM — D50.8 IRON DEFICIENCY ANEMIA SECONDARY TO INADEQUATE DIETARY IRON INTAKE: Status: ACTIVE | Noted: 2021-12-06

## 2021-12-06 PROBLEM — R94.4 DECREASED GFR: Status: ACTIVE | Noted: 2021-12-06

## 2021-12-06 NOTE — PROGRESS NOTES
You have consistent abnormalities noted in your CBC indicating some level of anemia but these are not new. Have you ever seen a hematologist?  Are you still taking your iron pills? If not let me know if you needs a refill. You also have a decrease in one of your kidney function labs but this is not an urgent decrease. We will monitor and I would ask that you avoid meds like ibuprofen, advil, aleve and motrin as these can be harmful to kidney functionYour other labs are basically normal.  Some values may be minimally outside the  \"normal\" range but are not harmful or clinically significant. Please contact the office if you have questions or concerns.   We will recheck all your labs at your next office visit

## 2021-12-08 PROBLEM — K59.1 FUNCTIONAL DIARRHEA: Status: RESOLVED | Noted: 2021-05-10 | Resolved: 2021-12-08

## 2021-12-08 PROBLEM — C44.310 BASAL CELL CARCINOMA (BCC) OF SKIN OF FACE: Status: RESOLVED | Noted: 2021-07-28 | Resolved: 2021-12-08

## 2021-12-08 PROBLEM — I77.9 PERIPHERAL ARTERIAL OCCLUSIVE DISEASE (HCC): Status: RESOLVED | Noted: 2020-11-16 | Resolved: 2021-12-08

## 2021-12-08 PROBLEM — Z71.6 ENCOUNTER FOR SMOKING CESSATION COUNSELING: Status: ACTIVE | Noted: 2021-07-28

## 2021-12-16 ENCOUNTER — PATIENT MESSAGE (OUTPATIENT)
Dept: FAMILY MEDICINE CLINIC | Age: 81
End: 2021-12-16

## 2021-12-16 DIAGNOSIS — E78.5 HYPERLIPIDEMIA, UNSPECIFIED HYPERLIPIDEMIA TYPE: Primary | ICD-10-CM

## 2021-12-16 NOTE — TELEPHONE ENCOUNTER
From: Artur Henderson  To: Mohit Tabares NP  Sent: 12/16/2021 1:00 PM EST  Subject: medications    Lindsey was on 4 meds for chlosterol. Now she is only taking Vascepa. She is now is feeling terrible. She is having tremors,dizzyness,shaking and pain. Do you think not taking 3 meds cold turkey could be causing this? Also please order a lipid panel blood test. If Medicare wont't pay we will.  Do you have any idea what it may cost?

## 2022-01-06 DIAGNOSIS — F41.9 ANXIETY: ICD-10-CM

## 2022-01-07 DIAGNOSIS — F41.9 ANXIETY: ICD-10-CM

## 2022-01-07 RX ORDER — ALPRAZOLAM 0.25 MG/1
TABLET ORAL
Qty: 75 TABLET | Refills: 0 | Status: SHIPPED | OUTPATIENT
Start: 2022-01-07 | End: 2022-05-11

## 2022-01-07 RX ORDER — ALPRAZOLAM 0.25 MG/1
TABLET ORAL
Qty: 75 TABLET | Refills: 0 | Status: SHIPPED | OUTPATIENT
Start: 2022-01-07 | End: 2022-05-11 | Stop reason: ALTCHOICE

## 2022-01-07 NOTE — TELEPHONE ENCOUNTER
Patient has been seen in the last 6 months and med last refilled [id number].   No inconsistencies noted in

## 2022-02-03 RX ORDER — OXYBUTYNIN CHLORIDE 10 MG/1
10 TABLET, EXTENDED RELEASE ORAL DAILY
Qty: 90 TABLET | Refills: 1 | Status: SHIPPED | OUTPATIENT
Start: 2022-02-03 | End: 2022-07-25

## 2022-03-18 PROBLEM — F41.9 ANXIETY: Status: ACTIVE | Noted: 2020-10-28

## 2022-03-19 PROBLEM — D64.9 ANEMIA: Status: ACTIVE | Noted: 2020-10-28

## 2022-03-19 PROBLEM — N52.9 PRIMARY ERECTILE DYSFUNCTION: Status: ACTIVE | Noted: 2020-10-28

## 2022-03-19 PROBLEM — R63.4 UNINTENTIONAL WEIGHT LOSS: Status: ACTIVE | Noted: 2021-05-10

## 2022-03-19 PROBLEM — D50.8 IRON DEFICIENCY ANEMIA SECONDARY TO INADEQUATE DIETARY IRON INTAKE: Status: ACTIVE | Noted: 2021-12-06

## 2022-03-19 PROBLEM — G47.00 INSOMNIA: Status: ACTIVE | Noted: 2020-10-28

## 2022-03-19 PROBLEM — R63.0 DECREASED APPETITE: Status: ACTIVE | Noted: 2021-07-28

## 2022-03-19 PROBLEM — E55.9 VITAMIN D DEFICIENCY: Status: ACTIVE | Noted: 2020-10-28

## 2022-03-19 PROBLEM — K59.09 OTHER CONSTIPATION: Status: ACTIVE | Noted: 2021-04-30

## 2022-03-19 PROBLEM — Z71.6 ENCOUNTER FOR SMOKING CESSATION COUNSELING: Status: ACTIVE | Noted: 2021-07-28

## 2022-03-20 PROBLEM — R94.4 DECREASED GFR: Status: ACTIVE | Noted: 2021-12-06

## 2022-03-21 DIAGNOSIS — F41.9 ANXIETY: Primary | ICD-10-CM

## 2022-03-21 RX ORDER — ALPRAZOLAM 0.25 MG/1
TABLET ORAL
Qty: 75 TABLET | Refills: 0 | Status: SHIPPED
Start: 2022-03-21 | End: 2022-06-13

## 2022-04-26 DIAGNOSIS — F41.9 ANXIETY: Primary | ICD-10-CM

## 2022-04-27 RX ORDER — ALPRAZOLAM 0.25 MG/1
TABLET ORAL
Qty: 75 TABLET | Refills: 0 | Status: SHIPPED | OUTPATIENT
Start: 2022-04-27 | End: 2022-06-06 | Stop reason: SDUPTHER

## 2022-04-27 NOTE — TELEPHONE ENCOUNTER
Patient has been seen in the last 6 months and med last refilled [id number].   No inconsistencies noted in

## 2022-05-11 ENCOUNTER — OFFICE VISIT (OUTPATIENT)
Dept: FAMILY MEDICINE CLINIC | Age: 82
End: 2022-05-11
Payer: MEDICARE

## 2022-05-11 VITALS
SYSTOLIC BLOOD PRESSURE: 124 MMHG | HEIGHT: 71 IN | RESPIRATION RATE: 16 BRPM | HEART RATE: 95 BPM | WEIGHT: 153 LBS | BODY MASS INDEX: 21.42 KG/M2 | TEMPERATURE: 97.2 F | DIASTOLIC BLOOD PRESSURE: 62 MMHG | OXYGEN SATURATION: 96 %

## 2022-05-11 DIAGNOSIS — Z12.2 ENCOUNTER FOR SCREENING FOR LUNG CANCER: Primary | ICD-10-CM

## 2022-05-11 DIAGNOSIS — F17.200 TOBACCO DEPENDENCE: ICD-10-CM

## 2022-05-11 DIAGNOSIS — I10 HYPERTENSION, UNSPECIFIED TYPE: ICD-10-CM

## 2022-05-11 DIAGNOSIS — R63.0 DECREASED APPETITE: ICD-10-CM

## 2022-05-11 DIAGNOSIS — Z86.79 HISTORY OF PERIPHERAL VASCULAR DISEASE: ICD-10-CM

## 2022-05-11 DIAGNOSIS — E78.5 HYPERLIPIDEMIA, UNSPECIFIED HYPERLIPIDEMIA TYPE: ICD-10-CM

## 2022-05-11 DIAGNOSIS — R63.4 UNINTENTIONAL WEIGHT LOSS: ICD-10-CM

## 2022-05-11 DIAGNOSIS — N18.30 STAGE 3 CHRONIC KIDNEY DISEASE, UNSPECIFIED WHETHER STAGE 3A OR 3B CKD (HCC): ICD-10-CM

## 2022-05-11 DIAGNOSIS — D50.8 IRON DEFICIENCY ANEMIA SECONDARY TO INADEQUATE DIETARY IRON INTAKE: ICD-10-CM

## 2022-05-11 DIAGNOSIS — I25.2 HISTORY OF MI (MYOCARDIAL INFARCTION): ICD-10-CM

## 2022-05-11 DIAGNOSIS — Z71.6 ENCOUNTER FOR SMOKING CESSATION COUNSELING: ICD-10-CM

## 2022-05-11 DIAGNOSIS — E55.9 VITAMIN D DEFICIENCY: ICD-10-CM

## 2022-05-11 DIAGNOSIS — R94.4 DECREASED GFR: ICD-10-CM

## 2022-05-11 PROCEDURE — G8432 DEP SCR NOT DOC, RNG: HCPCS | Performed by: NURSE PRACTITIONER

## 2022-05-11 PROCEDURE — G8754 DIAS BP LESS 90: HCPCS | Performed by: NURSE PRACTITIONER

## 2022-05-11 PROCEDURE — G8536 NO DOC ELDER MAL SCRN: HCPCS | Performed by: NURSE PRACTITIONER

## 2022-05-11 PROCEDURE — G8427 DOCREV CUR MEDS BY ELIG CLIN: HCPCS | Performed by: NURSE PRACTITIONER

## 2022-05-11 PROCEDURE — 1101F PT FALLS ASSESS-DOCD LE1/YR: CPT | Performed by: NURSE PRACTITIONER

## 2022-05-11 PROCEDURE — G8752 SYS BP LESS 140: HCPCS | Performed by: NURSE PRACTITIONER

## 2022-05-11 PROCEDURE — G8420 CALC BMI NORM PARAMETERS: HCPCS | Performed by: NURSE PRACTITIONER

## 2022-05-11 PROCEDURE — 99214 OFFICE O/P EST MOD 30 MIN: CPT | Performed by: NURSE PRACTITIONER

## 2022-05-11 NOTE — PROGRESS NOTES
Chief Complaint   Patient presents with    Follow Up Chronic Condition     24th may having surgery. 1. Have you been to the ER, urgent care clinic since your last visit? Hospitalized since your last visit? No    2. Have you seen or consulted any other health care providers outside of the [address] [place] [address] since your last visit? Include any pap smears or colon screening.  No     Visit Vitals  /62 (BP 1 Location: Left upper arm, BP Patient Position: Sitting, BP Cuff Size: Adult)   Pulse 95   Temp 97.2 °F (36.2 °C) (Temporal)   Resp 16   Ht 5' 11\" (1.803 m)   Wt 153 lb (69.4 kg)   SpO2 96%   BMI 21.34 kg/m²

## 2022-05-11 NOTE — PROGRESS NOTES
Subjective  Chief Complaint   Patient presents with    Follow Up Chronic Condition     24th may having surgery. HPI:  Kobe Jackson is a 80 y.o. male. 79 yo male presents for f/u of his chronic conditions HTN, PVD, anxiety, history of basal cell carcinoma and anemia. Of note is that he will be evaluated by a vascular specialist on [id number]. In preparation for that visit the patient had some radiological testing and was found to have a 3 cm aneurysm that the vascular specialist will be monitoring He continues to be a current every day smoker and will be given a referral for a LDCT. It is of note also that his unintentional weight loss has been stemed by him drinking 2 boosts a day and he feels that his appetite has also improved. He is otherwise without complaint    Past Medical History:   Diagnosis Date    Anemia 10/28/2020    Anxiety 10/28/2020    Basal cell carcinoma (BCC) of skin of face 7/28/2021    CAD (coronary artery disease) 1997    stents    Depression 2019    Ill-defined condition     High cholesterole    Insomnia 10/28/2020    Low hemoglobin     Peripheral arterial occlusive disease ([address] 11/16/2020    Primary erectile dysfunction 10/28/2020    Vitamin D deficiency 10/28/2020     Family History   Problem Relation Age of Onset    Heart Disease Mother     Migraines Mother      Social History     Socioeconomic History    Marital status:      Spouse name: Not on file    Number of children: Not on file    Years of education: Not on file    Highest education level: Not on file   Occupational History    Not on file   Tobacco Use    Smoking status: Current Every Day Smoker     Packs/day: 1.50     Years: 60.00     Pack years: 90.00     Types: Cigarettes    Smokeless tobacco: Never Used   Vaping Use    Vaping Use: Never used   Substance and Sexual Activity    Alcohol use: Yes     Alcohol/week: 0.0 standard drinks     Comment: 2 DRINKS A DAY.     Drug use: No    Sexual activity: Not Currently     Partners: Female     Birth control/protection: None   Other Topics Concern    Not on file   Social History Narrative    Not on file     Social Determinants of Health     Financial Resource Strain:     Difficulty of Paying Living Expenses: Not on file   Food Insecurity:     Worried About Running Out of Food in the Last Year: Not on file    Debra of Food in the Last Year: Not on file   Transportation Needs:     Lack of Transportation (Medical): Not on file    Lack of Transportation (Non-Medical): Not on file   Physical Activity:     Days of Exercise per Week: Not on file    Minutes of Exercise per Session: Not on file   Stress:     Feeling of Stress : Not on file   Social Connections:     Frequency of Communication with Friends and Family: Not on file    Frequency of Social Gatherings with Friends and Family: Not on file    Attends Congregation Services: Not on file    Active Member of [address] [place] [address] [employer] or Organizations: Not on file    Attends Club or Organization Meetings: Not on file    Marital Status: Not on file   Intimate Partner Violence:     Fear of Current or Ex-Partner: Not on file    Emotionally Abused: Not on file    Physically Abused: Not on file    Sexually Abused: Not on file   Housing Stability:     Unable to Pay for Housing in the Last Year: Not on file    Number of [place] in the Last Year: Not on file    Unstable Housing in the Last Year: Not on file     Current Outpatient Medications on File Prior to Visit   Medication Sig Dispense Refill    ALPRAZolam (XANAX) 0.25 mg tablet TAKE ONE TABLET BY MOUTH THREE TIMES A DAY AS NEEDED 75 Tablet 0    ALPRAZolam (XANAX) 0.25 mg tablet TAKE 1 TABLET BY MOUTH THREE TIMES A DAY AS NEEDED 75 Tablet 0    oxybutynin chloride XL (DITROPAN XL) 10 mg CR tablet Take 1 Tablet by mouth daily. 90 Tablet 1    OTHER Vesseclear      POLICOSANOL PO Take 10 mg by mouth.  2x daily      mirtazapine (REMERON) 15 mg tablet Take 1 Tablet by mouth nightly. 90 Tablet 1    tamsulosin (FLOMAX) 0.4 mg capsule TAKE ONE CAPSULE BY MOUTH DAILY 90 Capsule 1    megestroL (MEGACE) 40 mg tablet Take 1 Tablet by mouth daily. 90 Tablet 1    metoprolol succinate (TOPROL-XL) 25 mg XL tablet Take 0.5 Tablets by mouth daily.  isosorbide mononitrate (ISMO, MONOKET) 20 mg tablet Take 20 mg by mouth three (3) times daily.  aspirin delayed-release 81 mg tablet Take 81 mg by mouth daily.  FLUoxetine (PROzac) 20 mg capsule Take 20 mg by mouth daily.  cilostazoL (PLETAL) 50 mg tablet TAKE ONE TABLET BY MOUTH TWICE A DAY; TAKE AN HALF HOUR BEFORE OR 2 HOURS AFTER BREAKFAST AND DINNER      calcium acetate,phosphat bind, (PHOSLO) 667 mg cap Take  by mouth three (3) times daily (with meals).  glucosamine-chondroitin (ARTHX) 500-400 mg cap Take 1 Cap by mouth daily.  ferrous sulfate (IRON) 325 mg (65 mg iron) tablet Take 325 mg by mouth Daily (before breakfast).  clopidogrel (PLAVIX) 75 mg tablet Take 75 mg by mouth daily.  ALPRAZolam (XANAX) 0.25 mg tablet TAKE ONE TABLET BY MOUTH THREE TIMES A DAY AS NEEDED 75 Tablet 0    ALPRAZolam (XANAX) 0.25 mg tablet Take one tablet 3 times a day as needed 75 Tablet 0     No current facility-administered medications on file prior to visit. No Known Allergies  ROS   ROS per HPI and PMH      Objective  Physical Exam  HENT:      Head: Normocephalic. Cardiovascular:      Rate and Rhythm: Normal rate and regular rhythm. Heart sounds: Normal heart sounds. Pulmonary:      Breath sounds: Rales present. Abdominal:      General: Bowel sounds are normal.      Palpations: Abdomen is soft. Skin:     General: Skin is warm and dry. Neurological:      Mental Status: He is alert and oriented to person, place, and time. Psychiatric:         Mood and Affect: Mood normal.         Behavior: Behavior normal.         Thought Content:  Thought content normal.         Judgment: Judgment normal. Assessment & Plan      ICD-10-CM ICD-9-CM    1. Encounter for screening for lung cancer  Z12.2 V76.0 CT LOW DOSE LUNG CANCER SCREENING   2. Stage 3 chronic kidney disease, unspecified whether stage 3a or 3b CKD (HCC)  Q50.69 332.8 METABOLIC PANEL, COMPREHENSIVE   3. Hypertension, unspecified type  I10 401.9    4. Hyperlipidemia, unspecified hyperlipidemia type  E78.5 272.4 LIPID PANEL   5. Iron deficiency anemia secondary to inadequate dietary iron intake  D50.8 280.1 CBC WITH AUTOMATED DIFF   6. Vitamin D deficiency  E55.9 268.9 VITAMIN D, 25 HYDROXY   7. Tobacco dependence  F17.200 305.1 CT LOW DOSE LUNG CANCER SCREENING   8. History of MI (myocardial infarction)  I25.2 412    9. History of peripheral vascular disease  Z86.79 V12.59    10. Decreased appetite  R63.0 783.0    11. Encounter for smoking cessation counseling  Z71.6 V65.42      305.1    12. Decreased GFR  R94.4 794.4    13. Unintentional weight loss  R63.4 783.21      Diagnoses and all orders for this visit:    1. Encounter for screening for lung cancer  -     CT LOW DOSE LUNG CANCER SCREENING; Future  LDCT for additional clinical information    2. Stage 3 chronic kidney disease, unspecified whether stage 3a or 3b CKD ([facility] [address] [facility]  -     METABOLIC PANEL, COMPREHENSIVE  Obtaining updated CMP for trending and will make treatment decisions when I get the results        3. Hypertension, unspecified type  BP is at goal at 124/62. Will maintain patient at current dosage of present regimen    4. Hyperlipidemia, unspecified hyperlipidemia type  -     LIPID PANEL  Obtaining updated lipid panel for trending and will make treatment decisions when I get the results      5. Iron deficiency anemia secondary to inadequate dietary iron intake  -     CBC WITH AUTOMATED DIFF  Obtaining updated CBC for trending and will make treatment decisions when I get the results        6.  Vitamin D deficiency  -     VITAMIN D, 25 HYDROXY  Obtaining updated Vitamin D for trending and will make treatment decisions when I get the results    7. Tobacco dependence  -     CT LOW DOSE LUNG CANCER SCREENING; Future  LDCT for lung cancer screening    8. History of MI (myocardial infarction)  Patient is followed by Dr. Lacie Brown    9. History of peripheral vascular disease  Patient to be re-evaluated by a vascular specialist on [id number]    10. Decreased appetite  Patient states that his appetite has improved since he is doing BID boost.  Will continue with this regimen    11. Encounter for smoking cessation counseling  Patient will let me know    12. Decreased GFR  Obtaining updated CMP for trending and will make treatment decisions when I get the results    13. Unintentional weight loss  Patient has gained 20 lbs and feels that his weight is stable at 153-155. Follow-up and Dispositions    · Return in about 6 months (around 11/11/2022) for [address] with physical and fasting labs.        Olga Javier NP

## 2022-05-12 LAB
25(OH)D3+25(OH)D2 SERPL-MCNC: 85.6 NG/ML (ref 30–100)
ALBUMIN SERPL-MCNC: 4.4 G/DL (ref 3.7–4.7)
ALBUMIN/GLOB SERPL: 2 {RATIO} (ref 1.2–2.2)
ALP SERPL-CCNC: 105 IU/L (ref 44–121)
ALT SERPL-CCNC: 13 IU/L (ref 0–44)
AST SERPL-CCNC: 18 IU/L (ref 0–40)
BASOPHILS # BLD AUTO: 0.1 X10E3/UL (ref 0–0.2)
BASOPHILS NFR BLD AUTO: 1 %
BILIRUB SERPL-MCNC: 0.5 MG/DL (ref 0–1.2)
BUN SERPL-MCNC: 17 MG/DL (ref 8–27)
BUN/CREAT SERPL: 13 (ref 10–24)
CALCIUM SERPL-MCNC: 10.2 MG/DL (ref 8.6–10.2)
CHLORIDE SERPL-SCNC: 100 MMOL/L (ref 96–106)
CHOLEST SERPL-MCNC: 149 MG/DL (ref 100–199)
CO2 SERPL-SCNC: 25 MMOL/L (ref 20–29)
CREAT SERPL-MCNC: 1.36 MG/DL (ref 0.76–1.27)
EGFR: 53 ML/MIN/1.73
EOSINOPHIL # BLD AUTO: 0.2 X10E3/UL (ref 0–0.4)
EOSINOPHIL NFR BLD AUTO: 5 %
ERYTHROCYTE [DISTWIDTH] IN BLOOD BY AUTOMATED COUNT: 13 % (ref 11.6–15.4)
GLOBULIN SER CALC-MCNC: 2.2 G/DL (ref 1.5–4.5)
GLUCOSE SERPL-MCNC: 124 MG/DL (ref 65–99)
HCT VFR BLD AUTO: 40.8 % (ref 37.5–51)
HDLC SERPL-MCNC: 59 MG/DL
HGB BLD-MCNC: 13.7 G/DL (ref 13–17.7)
IMM GRANULOCYTES # BLD AUTO: 0 X10E3/UL (ref 0–0.1)
IMM GRANULOCYTES NFR BLD AUTO: 0 %
LDLC SERPL CALC-MCNC: 69 MG/DL (ref 0–99)
LYMPHOCYTES # BLD AUTO: 1.1 X10E3/UL (ref 0.7–3.1)
LYMPHOCYTES NFR BLD AUTO: 25 %
MCH RBC QN AUTO: 34.5 PG (ref 26.6–33)
MCHC RBC AUTO-ENTMCNC: 33.6 G/DL (ref 31.5–35.7)
MCV RBC AUTO: 103 FL (ref 79–97)
MONOCYTES # BLD AUTO: 0.6 X10E3/UL (ref 0.1–0.9)
MONOCYTES NFR BLD AUTO: 15 %
NEUTROPHILS # BLD AUTO: 2.4 X10E3/UL (ref 1.4–7)
NEUTROPHILS NFR BLD AUTO: 54 %
PLATELET # BLD AUTO: 164 X10E3/UL (ref 150–450)
POTASSIUM SERPL-SCNC: 4.7 MMOL/L (ref 3.5–5.2)
PROT SERPL-MCNC: 6.6 G/DL (ref 6–8.5)
RBC # BLD AUTO: 3.97 X10E6/UL (ref 4.14–5.8)
SODIUM SERPL-SCNC: 139 MMOL/L (ref 134–144)
TRIGL SERPL-MCNC: 121 MG/DL (ref 0–149)
VLDLC SERPL CALC-MCNC: 21 MG/DL (ref 5–40)
WBC # BLD AUTO: 4.4 X10E3/UL (ref 3.4–10.8)

## 2022-05-24 DIAGNOSIS — Z12.2 ENCOUNTER FOR SCREENING FOR LUNG CANCER: ICD-10-CM

## 2022-05-24 DIAGNOSIS — M54.50 CHRONIC LOW BACK PAIN WITHOUT SCIATICA, UNSPECIFIED BACK PAIN LATERALITY: Primary | ICD-10-CM

## 2022-05-24 DIAGNOSIS — E78.5 HYPERLIPIDEMIA, UNSPECIFIED HYPERLIPIDEMIA TYPE: ICD-10-CM

## 2022-05-24 DIAGNOSIS — F17.200 TOBACCO DEPENDENCE: ICD-10-CM

## 2022-05-24 DIAGNOSIS — G89.29 CHRONIC LOW BACK PAIN WITHOUT SCIATICA, UNSPECIFIED BACK PAIN LATERALITY: Primary | ICD-10-CM

## 2022-05-25 RX ORDER — TAMSULOSIN HYDROCHLORIDE 0.4 MG/1
0.4 CAPSULE ORAL DAILY
Qty: 90 CAPSULE | Refills: 1 | Status: SHIPPED | OUTPATIENT
Start: 2022-05-25

## 2022-06-06 DIAGNOSIS — F41.9 ANXIETY: ICD-10-CM

## 2022-06-08 RX ORDER — ALPRAZOLAM 0.25 MG/1
0.25 TABLET ORAL
Qty: 75 TABLET | Refills: 0 | Status: SHIPPED | OUTPATIENT
Start: 2022-06-08 | End: 2022-07-19

## 2022-06-13 ENCOUNTER — OFFICE VISIT (OUTPATIENT)
Dept: FAMILY MEDICINE CLINIC | Age: 82
End: 2022-06-13
Payer: MEDICARE

## 2022-06-13 VITALS
OXYGEN SATURATION: 97 % | WEIGHT: 153 LBS | HEIGHT: 71 IN | HEART RATE: 60 BPM | DIASTOLIC BLOOD PRESSURE: 70 MMHG | RESPIRATION RATE: 16 BRPM | BODY MASS INDEX: 21.42 KG/M2 | SYSTOLIC BLOOD PRESSURE: 98 MMHG

## 2022-06-13 DIAGNOSIS — M79.605 BILATERAL LEG PAIN: ICD-10-CM

## 2022-06-13 DIAGNOSIS — M79.604 BILATERAL LEG PAIN: ICD-10-CM

## 2022-06-13 DIAGNOSIS — E78.5 HYPERLIPIDEMIA, UNSPECIFIED HYPERLIPIDEMIA TYPE: ICD-10-CM

## 2022-06-13 DIAGNOSIS — I25.2 HISTORY OF MI (MYOCARDIAL INFARCTION): ICD-10-CM

## 2022-06-13 DIAGNOSIS — R09.89 BILATERAL CAROTID BRUITS: ICD-10-CM

## 2022-06-13 DIAGNOSIS — F13.20 BENZODIAZEPINE DEPENDENCE (HCC): ICD-10-CM

## 2022-06-13 DIAGNOSIS — M75.42 IMPINGEMENT SYNDROME OF LEFT SHOULDER: ICD-10-CM

## 2022-06-13 DIAGNOSIS — N18.30 STAGE 3 CHRONIC KIDNEY DISEASE, UNSPECIFIED WHETHER STAGE 3A OR 3B CKD (HCC): ICD-10-CM

## 2022-06-13 DIAGNOSIS — I10 HYPERTENSION, UNSPECIFIED TYPE: Primary | ICD-10-CM

## 2022-06-13 DIAGNOSIS — I25.10 CORONARY ARTERY DISEASE, UNSPECIFIED VESSEL OR LESION TYPE, UNSPECIFIED WHETHER ANGINA PRESENT, UNSPECIFIED WHETHER NATIVE OR TRANSPLANTED HEART: ICD-10-CM

## 2022-06-13 PROCEDURE — G8754 DIAS BP LESS 90: HCPCS | Performed by: NURSE PRACTITIONER

## 2022-06-13 PROCEDURE — 99214 OFFICE O/P EST MOD 30 MIN: CPT | Performed by: NURSE PRACTITIONER

## 2022-06-13 PROCEDURE — G8427 DOCREV CUR MEDS BY ELIG CLIN: HCPCS | Performed by: NURSE PRACTITIONER

## 2022-06-13 PROCEDURE — G8536 NO DOC ELDER MAL SCRN: HCPCS | Performed by: NURSE PRACTITIONER

## 2022-06-13 PROCEDURE — G8420 CALC BMI NORM PARAMETERS: HCPCS | Performed by: NURSE PRACTITIONER

## 2022-06-13 PROCEDURE — 1101F PT FALLS ASSESS-DOCD LE1/YR: CPT | Performed by: NURSE PRACTITIONER

## 2022-06-13 PROCEDURE — G8752 SYS BP LESS 140: HCPCS | Performed by: NURSE PRACTITIONER

## 2022-06-13 PROCEDURE — 1123F ACP DISCUSS/DSCN MKR DOCD: CPT | Performed by: NURSE PRACTITIONER

## 2022-06-13 PROCEDURE — G8432 DEP SCR NOT DOC, RNG: HCPCS | Performed by: NURSE PRACTITIONER

## 2022-06-13 NOTE — PROGRESS NOTES
Subjective  Chief Complaint   Patient presents with    Follow Up Chronic Condition     had a fall 6mth     HPI:  Debbie Avalos is a 80 y.o. male. 81 yo male presents after a fall when he was taking the trash out and it was heavier that usual and he fell and hit his head but does not feel that he is having any issues from the fall. We are also discussing the use of xanax for sleep. Patient has been on it for >10 years and has developed a dependence. We discussed that it is not a sleep med and was not meant for long term usage and patient understands the risks and chooses to continue. Labs of Dr. Karl Silva reviewed    Past Medical History:   Diagnosis Date    Anemia 10/28/2020    Anxiety 10/28/2020    Basal cell carcinoma (BCC) of skin of face 7/28/2021    CAD (coronary artery disease) 1997    stents    Depression 2019    Ill-defined condition     High cholesterole    Insomnia 10/28/2020    Low hemoglobin     Peripheral arterial occlusive disease ([facility] [address] [address] 11/16/2020    Primary erectile dysfunction 10/28/2020    Vitamin D deficiency 10/28/2020     Family History   Problem Relation Age of Onset    Heart Disease Mother     Migraines Mother      Social History     Socioeconomic History    Marital status:      Spouse name: Not on file    Number of children: Not on file    Years of education: Not on file    Highest education level: Not on file   Occupational History    Not on file   Tobacco Use    Smoking status: Current Every Day Smoker     Packs/day: 1.50     Years: 60.00     Pack years: 90.00     Types: Cigarettes    Smokeless tobacco: Never Used   Vaping Use    Vaping Use: Never used   Substance and Sexual Activity    Alcohol use: Yes     Alcohol/week: 0.0 standard drinks     Comment: 2 DRINKS A DAY.     Drug use: No    Sexual activity: Not Currently     Partners: Female     Birth control/protection: None   Other Topics Concern    Not on file   Social History Narrative    Not on file     Social Determinants of Health     Financial Resource Strain:     Difficulty of Paying Living Expenses: Not on file   Food Insecurity:     Worried About Running Out of Food in the Last Year: Not on file    Debra of Food in the Last Year: Not on file   Transportation Needs:     Lack of Transportation (Medical): Not on file    Lack of Transportation (Non-Medical): Not on file   Physical Activity:     Days of Exercise per Week: Not on file    Minutes of Exercise per Session: Not on file   Stress:     Feeling of Stress : Not on file   Social Connections:     Frequency of Communication with Friends and Family: Not on file    Frequency of Social Gatherings with Friends and Family: Not on file    Attends Faith Services: Not on file    Active Member of [address] [place] [address] [employer] or Organizations: Not on file    Attends Club or Organization Meetings: Not on file    Marital Status: Not on file   Intimate Partner Violence:     Fear of Current or Ex-Partner: Not on file    Emotionally Abused: Not on file    Physically Abused: Not on file    Sexually Abused: Not on file   Housing Stability:     Unable to Pay for Housing in the Last Year: Not on file    Number of [place] in the Last Year: Not on file    Unstable Housing in the Last Year: Not on file     Current Outpatient Medications on File Prior to Visit   Medication Sig Dispense Refill    ALPRAZolam (XANAX) 0.25 mg tablet Take 1 Tablet by mouth three (3) times daily as needed for Anxiety. Max Daily Amount: 0.75 mg. 75 Tablet 0    tamsulosin (FLOMAX) 0.4 mg capsule Take 1 Capsule by mouth daily. 90 Capsule 1    oxybutynin chloride XL (DITROPAN XL) 10 mg CR tablet Take 1 Tablet by mouth daily. 90 Tablet 1    OTHER Vesseclear      POLICOSANOL PO Take 10 mg by mouth. 2x daily      mirtazapine (REMERON) 15 mg tablet Take 1 Tablet by mouth nightly. 90 Tablet 1    megestroL (MEGACE) 40 mg tablet Take 1 Tablet by mouth daily.  90 Tablet 1    metoprolol succinate (TOPROL-XL) 25 mg XL tablet Take 0.5 Tablets by mouth daily.  isosorbide mononitrate (ISMO, MONOKET) 20 mg tablet Take 20 mg by mouth three (3) times daily.  aspirin delayed-release 81 mg tablet Take 81 mg by mouth daily.  FLUoxetine (PROzac) 20 mg capsule Take 20 mg by mouth daily.  cilostazoL (PLETAL) 50 mg tablet TAKE ONE TABLET BY MOUTH TWICE A DAY; TAKE AN HALF HOUR BEFORE OR 2 HOURS AFTER BREAKFAST AND DINNER      calcium acetate,phosphat bind, (PHOSLO) 667 mg cap Take  by mouth three (3) times daily (with meals).  glucosamine-chondroitin (ARTHX) 500-400 mg cap Take 1 Cap by mouth daily.  ferrous sulfate (IRON) 325 mg (65 mg iron) tablet Take 325 mg by mouth Daily (before breakfast).  clopidogrel (PLAVIX) 75 mg tablet Take 75 mg by mouth daily.  ALPRAZolam (XANAX) 0.25 mg tablet TAKE 1 TABLET BY MOUTH THREE TIMES A DAY AS NEEDED 75 Tablet 0     No current facility-administered medications on file prior to visit. No Known Allergies  ROS   ROS per HPI and PMH      Objective  Physical Exam  Vitals and nursing note reviewed. HENT:      Head: Normocephalic. Neurological:      Mental Status: He is alert and oriented to person, place, and time. Psychiatric:         Mood and Affect: Mood normal.         Behavior: Behavior normal.         Thought Content: Thought content normal.         Judgment: Judgment normal.          Assessment & Plan      ICD-10-CM ICD-9-CM    1. Hypertension, unspecified type  I10 401.9    2. Stage 3 chronic kidney disease, unspecified whether stage 3a or 3b CKD ([facility])  N18.30 585.3    3. Coronary artery disease, unspecified vessel or lesion type, unspecified whether angina present, unspecified whether native or transplanted heart  I25.10 414.00    4. Impingement syndrome of left shoulder  M75.42 726.2    5. History of MI (myocardial infarction)  I25.2 412    6.  Hyperlipidemia, unspecified hyperlipidemia type  E78.5 272.4    7. Bilateral carotid bruits  R09.89 785.9    8. Benzodiazepine dependence (HCC)  F13.20 304.10    9. Bilateral leg pain  M79.604 729.5     M79.605       Diagnoses and all orders for this visit:    1. Hypertension, unspecified type  Patient is followed by cardiology for this condition    2. Stage 3 chronic kidney disease, unspecified whether stage 3a or 3b CKD (HCC)  Mild abnormalities notde Creatining 1.36 and GFR 53. Otherwise CMP normal.  Will continue to monitor    3. Coronary artery disease, unspecified vessel or lesion type, unspecified whether angina present, unspecified whether native or transplanted heart    4. Impingement syndrome of left shoulder  Patient if followed by [facility] for this condition    5. History of MI (myocardial infarction)  Patient is followed by cardiology    6. Hyperlipidemia, unspecified hyperlipidemia type  Lipid panel in May of 2022 is WNL    7. Bilateral carotid bruits  Patient is followed by cardiology      8. Benzodiazepine dependence ([address]  We will continue with the use of xanax and patient has been made aware of the risks of long term daily use of benzodiazepine    9. Bilateral leg pain  Patient is followed by cardiology who has referred him to a vaclar specialist      Follow-up and Dispositions    · Return in about 6 months (around 12/13/2022) for [address] with physical and fasting labs.        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 Tmax 100.2F. + decrease in appetite. +post tussive vomiting x 2 episodes NBNB. + covid exposure from grandfather who was recently tested positive. + sick contacts in parents with URI symptoms. Pt is currently remote learning. Denies lethargy, weakness, abd pain, decrease in urination, ear or throat pain, CP, SOB, skin rash, recent travel.    nkda  Vaccines UTD

## 2022-06-13 NOTE — PROGRESS NOTES
Chief Complaint   Patient presents with    Follow Up Chronic Condition     had a fall 6mth     1. \"Have you been to the ER, urgent care clinic since your last visit? Hospitalized since your last visit? \" No    2. \"Have you seen or consulted any other health care providers outside of the [address] [place] [address] since your last visit? \" No     3. For patients aged 39-70: Has the patient had a colonoscopy / FIT/ Cologuard? No      If the patient is female:    4. For patients aged 41-77: Has the patient had a mammogram within the past 2 years? NA - based on age or sex      11. For patients aged 21-65: Has the patient had a pap smear? NA - based on age or sex    Visit Vitals  BP 98/70 (BP 1 Location: Left upper arm, BP Patient Position: Sitting, BP Cuff Size: Adult)   Pulse 60   Resp 16   Ht 5' 11\" (1.803 m)   Wt 153 lb (69.4 kg)   SpO2 97%   BMI 21.34 kg/m²     Fall Risk Assessment, last 12 mths 6/13/2022   Able to walk? Yes   Fall in past 12 months? 1   Do you feel unsteady? 0   Are you worried about falling 0   Number of falls in past 12 months 1   Fall with injury?  1

## 2022-06-20 RX ORDER — MIRTAZAPINE 15 MG/1
TABLET, FILM COATED ORAL
Qty: 90 TABLET | Refills: 1 | Status: SHIPPED | OUTPATIENT
Start: 2022-06-20

## 2022-06-28 ENCOUNTER — PATIENT MESSAGE (OUTPATIENT)
Dept: FAMILY MEDICINE CLINIC | Age: 82
End: 2022-06-28

## 2022-06-29 RX ORDER — LOPERAMIDE HCL 2 MG
TABLET ORAL
Qty: 180 TABLET | Refills: 0 | Status: SHIPPED | OUTPATIENT
Start: 2022-06-29 | End: 2022-07-06 | Stop reason: SDUPTHER

## 2022-06-29 NOTE — TELEPHONE ENCOUNTER
From: Sonny Goins  Sent: 6/29/2022 12:54 PM EDT  To: [facility] Nurse  Subject: uncontrollable Diaria     Nothing new. I was taking Metamucil. Stopped taking a few days ago.

## 2022-07-04 ENCOUNTER — PATIENT MESSAGE (OUTPATIENT)
Dept: FAMILY MEDICINE CLINIC | Age: 82
End: 2022-07-04

## 2022-07-05 NOTE — TELEPHONE ENCOUNTER
From: Tiarra Estevez  To: Kenji Mayer NP  Sent: 7/4/2022 2:22 PM EDT  Subject: [place] Sender    What medicine did you send in? Have not heard from [employer].

## 2022-07-06 RX ORDER — LOPERAMIDE HCL 2 MG
TABLET ORAL
Qty: 180 TABLET | Refills: 0 | Status: SHIPPED | OUTPATIENT
Start: 2022-07-06

## 2022-07-16 DIAGNOSIS — F41.9 ANXIETY: ICD-10-CM

## 2022-07-19 ENCOUNTER — PATIENT MESSAGE (OUTPATIENT)
Dept: FAMILY MEDICINE CLINIC | Age: 82
End: 2022-07-19

## 2022-07-19 RX ORDER — ALPRAZOLAM 0.25 MG/1
TABLET ORAL
Qty: 75 TABLET | Refills: 0 | Status: SHIPPED | OUTPATIENT
Start: 2022-07-19 | End: 2022-08-25

## 2022-07-19 NOTE — TELEPHONE ENCOUNTER
From: Felix Mishra  To: Andrew Seth NP  Sent: 7/19/2022 3:03 PM EDT  Subject: ALPRAZOLAM    Please send prescription to [facility].  I need this!!

## 2022-07-19 NOTE — TELEPHONE ENCOUNTER
Refilling xanax and last refilled [id number] and patient has been seen in the last 6 months.   No inconsistencies noted in

## 2022-07-25 RX ORDER — OXYBUTYNIN CHLORIDE 10 MG/1
TABLET, EXTENDED RELEASE ORAL
Qty: 90 TABLET | Refills: 1 | Status: SHIPPED | OUTPATIENT
Start: 2022-07-25

## 2022-07-25 RX ORDER — OXYBUTYNIN CHLORIDE 10 MG/1
10 TABLET, EXTENDED RELEASE ORAL DAILY
Qty: 90 TABLET | Refills: 1 | Status: SHIPPED | OUTPATIENT
Start: 2022-07-25

## 2022-07-26 NOTE — PROGRESS NOTES
There are abnormalities in your CBC but they are not new and remain stable. One of your kidney labs is also below baseline and I would recommend that you avoid meds like motrin, aleve or ibuprofen as they can be detrimental to kidney function,  This is not the first indication of kidney lab abnormalities but the last time was about a year ago so we will continue to monitor and if it continues we can talk about a kidney specialist referral. Your other labs are basically normal.  Some values may be minimally outside the  \"normal\" range but are not harmful or clinically significant. Please contact the office if you have questions or concerns.   We will recheck your labs at your next office visit

## 2022-08-25 DIAGNOSIS — F41.9 ANXIETY: ICD-10-CM

## 2022-08-25 RX ORDER — ALPRAZOLAM 0.25 MG/1
TABLET ORAL
Qty: 75 TABLET | Refills: 0 | Status: SHIPPED | OUTPATIENT
Start: 2022-08-25 | End: 2022-09-28

## 2022-09-27 DIAGNOSIS — F41.9 ANXIETY: ICD-10-CM

## 2022-09-28 RX ORDER — ALPRAZOLAM 0.25 MG/1
TABLET ORAL
Qty: 75 TABLET | Refills: 0 | Status: SHIPPED | OUTPATIENT
Start: 2022-09-28 | End: 2022-11-01 | Stop reason: SDUPTHER

## 2022-10-13 LAB
CHOLEST SERPL-MCNC: 140 MG/DL (ref 100–199)
HDLC SERPL-MCNC: 57 MG/DL
LDLC SERPL CALC-MCNC: 67 MG/DL (ref 0–99)
TRIGL SERPL-MCNC: 83 MG/DL (ref 0–149)
VLDLC SERPL CALC-MCNC: 16 MG/DL (ref 5–40)

## 2022-11-01 ENCOUNTER — PATIENT MESSAGE (OUTPATIENT)
Dept: FAMILY MEDICINE CLINIC | Age: 82
End: 2022-11-01

## 2022-11-01 DIAGNOSIS — F41.9 ANXIETY: ICD-10-CM

## 2022-11-01 NOTE — TELEPHONE ENCOUNTER
From: Clarisa Goes  To: Virgen Daly NP  Sent: 11/1/2022 12:56 PM EDT  Subject: ALPRAZOLAM    PLEASE SEND THIS PRESCRIPTION TO [facility]. I AM OUT OF PILLS.

## 2022-11-02 RX ORDER — ALPRAZOLAM 0.25 MG/1
TABLET ORAL
Qty: 75 TABLET | Refills: 0 | Status: SHIPPED | OUTPATIENT
Start: 2022-11-02

## 2022-11-02 NOTE — TELEPHONE ENCOUNTER
Xanax last refilled [id number] and patient has been seen in the last 6 months.   No inconsistencies noted in the

## 2022-11-29 RX ORDER — TAMSULOSIN HYDROCHLORIDE 0.4 MG/1
CAPSULE ORAL
Qty: 90 CAPSULE | Refills: 1 | Status: SHIPPED | OUTPATIENT
Start: 2022-11-29

## 2022-12-12 ENCOUNTER — PATIENT MESSAGE (OUTPATIENT)
Dept: FAMILY MEDICINE CLINIC | Age: 82
End: 2022-12-12

## 2022-12-15 ENCOUNTER — TELEPHONE (OUTPATIENT)
Dept: FAMILY MEDICINE CLINIC | Age: 82
End: 2022-12-15

## 2022-12-15 NOTE — TELEPHONE ENCOUNTER
Reason for call:  Jase Running from [address] [place] [address] Rd calling in regards to pt--she said the Physical Therapist called yesterday, in regards to an order for OT, for shower safety. She is wondering if it is okay if they come next week Monday or Tuesday instead of tomorrow 12/16.  Arielle's direct extension is: [phone]    Is this a new problem: yes     Date of last appointment:  6/13/2022     Can we respond via [employer]: no    Best call back number: [phone]

## 2022-12-15 NOTE — TELEPHONE ENCOUNTER
Patient just recently released from [address] [place] [address] after his stroke. Has an upcoming appointment but wife wants to know if we can call [facility] and have someone come and remove his cathter.

## 2022-12-15 NOTE — TELEPHONE ENCOUNTER
Patient was evaluated today and they will be working with him on PT home health and they will bring in an OT for evaluation for shower care.

## 2022-12-15 NOTE — TELEPHONE ENCOUNTER
Do they need an approval from [facility]? Do you have a return call number?

## 2022-12-16 NOTE — TELEPHONE ENCOUNTER
Spoke to [place] and advised ok to do OT for shower safety. Also asked about the catheter and she stated that she has already informed pt that she will need to talk to the specialist who placed the catheter about when it can be removed and they will have to give the nursing staff with [facility] an order to do so.

## 2022-12-19 ENCOUNTER — TELEPHONE (OUTPATIENT)
Dept: FAMILY MEDICINE CLINIC | Age: 82
End: 2022-12-19

## 2022-12-19 NOTE — TELEPHONE ENCOUNTER
Spoke with Pt's wife. Informed Pt's wife that we can not put in an order due to [facility] informing us they can not take our order due to us not being the one put in. There is a message from 12/15/22 for reference from [address] [place] [address] Luz Maria, [address] stated that she did not understand why they would not take out the catheter on terrys order. I informed that she needs to contact the person that put it in so they can decide if the catheter was in long enough. Pt stated she did not know hwo put it in. I advised to call the hospital. Pt stated that when it was put in she was not in room. 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 \" I advised pt not to do this as it could cause harm she stated that she understood I would not but she is going to do it so it doesn't bother him.

## 2022-12-19 NOTE — TELEPHONE ENCOUNTER
Patient's wife is requesting we call the nursing agency at [phone] [facility] to put in an order to remove Andrade Cath.

## 2022-12-26 DIAGNOSIS — F41.9 ANXIETY: ICD-10-CM

## 2022-12-28 RX ORDER — ALPRAZOLAM 0.25 MG/1
TABLET ORAL
Qty: 75 TABLET | Refills: 0 | Status: SHIPPED | OUTPATIENT
Start: 2022-12-28

## 2022-12-29 ENCOUNTER — OFFICE VISIT (OUTPATIENT)
Dept: FAMILY MEDICINE CLINIC | Age: 82
End: 2022-12-29
Payer: MEDICARE

## 2022-12-29 VITALS
HEART RATE: 55 BPM | DIASTOLIC BLOOD PRESSURE: 38 MMHG | OXYGEN SATURATION: 97 % | TEMPERATURE: 97.2 F | HEIGHT: 71 IN | RESPIRATION RATE: 16 BRPM | SYSTOLIC BLOOD PRESSURE: 100 MMHG | WEIGHT: 165 LBS | BODY MASS INDEX: 23.1 KG/M2

## 2022-12-29 DIAGNOSIS — Z87.440 HISTORY OF UTI: ICD-10-CM

## 2022-12-29 DIAGNOSIS — Z86.73 STATUS POST CVA: ICD-10-CM

## 2022-12-29 DIAGNOSIS — I25.2 HISTORY OF MI (MYOCARDIAL INFARCTION): ICD-10-CM

## 2022-12-29 DIAGNOSIS — I10 HYPERTENSION, UNSPECIFIED TYPE: ICD-10-CM

## 2022-12-29 DIAGNOSIS — F10.10 ALCOHOL ABUSE: ICD-10-CM

## 2022-12-29 DIAGNOSIS — F17.200 TOBACCO DEPENDENCE: ICD-10-CM

## 2022-12-29 DIAGNOSIS — T83.511D URINARY TRACT INFECTION ASSOCIATED WITH INDWELLING URETHRAL CATHETER, SUBSEQUENT ENCOUNTER: ICD-10-CM

## 2022-12-29 DIAGNOSIS — N39.0 URINARY TRACT INFECTION ASSOCIATED WITH INDWELLING URETHRAL CATHETER, SUBSEQUENT ENCOUNTER: ICD-10-CM

## 2022-12-29 DIAGNOSIS — R33.9 URINARY RETENTION: ICD-10-CM

## 2022-12-29 DIAGNOSIS — Z09 HOSPITAL DISCHARGE FOLLOW-UP: Primary | ICD-10-CM

## 2022-12-29 DIAGNOSIS — F13.20 BENZODIAZEPINE DEPENDENCE (HCC): ICD-10-CM

## 2022-12-29 DIAGNOSIS — Z71.6 ENCOUNTER FOR SMOKING CESSATION COUNSELING: ICD-10-CM

## 2022-12-29 LAB
BACTERIA UA POCT, BACTPOCT: NORMAL
BILIRUB UR QL STRIP: NEGATIVE
CASTS UA POCT: NORMAL
CLUE CELLS, CLUEPOCT: NORMAL
CRYSTALS UA POCT, CRYSPOCT: NORMAL
EPITHELIAL CELLS POCT: NORMAL
GLUCOSE UR-MCNC: NEGATIVE MG/DL
KETONES P FAST UR STRIP-MCNC: NEGATIVE MG/DL
MUCUS UA POCT, MUCPOCT: NORMAL
PH UR STRIP: 7 [PH] (ref 4.6–8)
PROT UR QL STRIP: NEGATIVE
RBC UA POCT, RBCPOCT: NORMAL
SP GR UR STRIP: 1.02 (ref 1–1.03)
TRICH UA POCT, TRICHPOC: NORMAL
UA UROBILINOGEN AMB POC: NORMAL (ref 0.2–1)
URINALYSIS CLARITY POC: NORMAL
URINALYSIS COLOR POC: NORMAL
URINE BLOOD POC: NORMAL
URINE CULT COMMENT, POCT: NORMAL
URINE LEUKOCYTES POC: NORMAL
URINE NITRITES POC: NEGATIVE
WBC UA POCT, WBCPOCT: NORMAL
YEAST UA POCT, YEASTPOC: NORMAL

## 2022-12-29 PROCEDURE — 81001 URINALYSIS AUTO W/SCOPE: CPT | Performed by: NURSE PRACTITIONER

## 2022-12-29 PROCEDURE — 1123F ACP DISCUSS/DSCN MKR DOCD: CPT | Performed by: NURSE PRACTITIONER

## 2022-12-29 PROCEDURE — G8536 NO DOC ELDER MAL SCRN: HCPCS | Performed by: NURSE PRACTITIONER

## 2022-12-29 PROCEDURE — 99214 OFFICE O/P EST MOD 30 MIN: CPT | Performed by: NURSE PRACTITIONER

## 2022-12-29 PROCEDURE — G8432 DEP SCR NOT DOC, RNG: HCPCS | Performed by: NURSE PRACTITIONER

## 2022-12-29 PROCEDURE — G8427 DOCREV CUR MEDS BY ELIG CLIN: HCPCS | Performed by: NURSE PRACTITIONER

## 2022-12-29 PROCEDURE — G8420 CALC BMI NORM PARAMETERS: HCPCS | Performed by: NURSE PRACTITIONER

## 2022-12-29 PROCEDURE — 3074F SYST BP LT 130 MM HG: CPT | Performed by: NURSE PRACTITIONER

## 2022-12-29 PROCEDURE — 3078F DIAST BP <80 MM HG: CPT | Performed by: NURSE PRACTITIONER

## 2022-12-29 PROCEDURE — 1101F PT FALLS ASSESS-DOCD LE1/YR: CPT | Performed by: NURSE PRACTITIONER

## 2022-12-29 RX ORDER — AMIODARONE HYDROCHLORIDE 200 MG/1
200 TABLET ORAL
COMMUNITY
Start: 2022-12-09

## 2022-12-29 RX ORDER — SULFAMETHOXAZOLE AND TRIMETHOPRIM 800; 160 MG/1; MG/1
1 TABLET ORAL
COMMUNITY
Start: 2022-12-09 | End: 2022-12-29 | Stop reason: ALTCHOICE

## 2022-12-29 RX ORDER — ATORVASTATIN CALCIUM 40 MG/1
40 TABLET, FILM COATED ORAL
COMMUNITY
Start: 2022-12-09

## 2022-12-29 RX ORDER — NITROFURANTOIN 25; 75 MG/1; MG/1
100 CAPSULE ORAL 2 TIMES DAILY
Qty: 10 CAPSULE | Refills: 0 | Status: SHIPPED | OUTPATIENT
Start: 2022-12-29 | End: 2023-01-03

## 2022-12-29 NOTE — PROGRESS NOTES
Subjective  Chief Complaint   Patient presents with    Hospital Follow Up     Stroke     HPI:  Abby Helm is a [de-identified] y.o. male. 45-year-old male presents for hospital discharge follow-up from November 29, 2022. EMS was called to his house by his wife who states that he was not acting right. Per his wife he drinks slightly and was drinking that evening and when he arrived at the ED he was altered. He did not answer questions appropriately and he had the odor of alcohol. CT of his head showed no acute intracranial abnormality but it did show an abrupt cut off within the proximal M2 segment of the inferior coronary artery. It also showed moderate atheromatous plaques involving both carotids but there was no hemodynamically significant stenosis of the cervical carotid and vertebral arteries. He was also diagnosed with urinary retention and a UTI. A catheter was placed by urology and he had a follow-up appointment yesterday and they remove the catheter. They wanted to replace it but patient refused. His appetite is decreased but it was never great even before this and he was advised to do boost 3 times a day even prior to this incident. He has an upcoming appointment with neurology and has a stress test scheduled with Dr. Gissell Gann in January. With diagnoses that were found at his ER visit including acute ischemic left MCA stroke, combined receptive and expressive aphasia as well may affect other cerebrovascular accident, prostate enlargement, coronary artery disease, hyperlipidemia, and peripheral arterial disease. He was subsequently admitted for the left MCA stroke and before he left the hospital had good improvement in his ADLs and mobility but still has severe aphasia. He was treated with Bactrim for the UTI for 5 days. His Toprol was DC'd because of bradycardia and his instructions were to hold his Clydene Kenia if his systolic blood pressure was less than 110. I covered this instruction with the wife. Patient was anxious to be discharged and was only discharged since his wife agreed to provide 24-hour care. Patient continues to smoke and smokes a pack a day. We also discussed use of alcohol with his benzodiazepine. We discussed how dangerous this could be and even to the point of killing him. Patient states he has been doing this for years and has no intention of stopping. He is also precontemplative as far as discontinuing smoking. He is followed by home health. He has a skilled nurse 2 times a week OT, PT, and speech therapy. Past Medical History:   Diagnosis Date    Anemia 10/28/2020    Anxiety 10/28/2020    Basal cell carcinoma (BCC) of skin of face 7/28/2021    CAD (coronary artery disease) 1997    stents    Depression 2019    Ill-defined condition     High cholesterole    Insomnia 10/28/2020    Low hemoglobin     Peripheral arterial occlusive disease ([facility] [address] 11/16/2020    Primary erectile dysfunction 10/28/2020    Vitamin D deficiency 10/28/2020     Family History   Problem Relation Age of Onset    Heart Disease Mother     Migraines Mother      Social History     Socioeconomic History    Marital status:      Spouse name: Not on file    Number of children: Not on file    Years of education: Not on file    Highest education level: Not on file   Occupational History    Not on file   Tobacco Use    Smoking status: Every Day     Packs/day: 1.50     Years: 60.00     Pack years: 90.00     Types: Cigarettes    Smokeless tobacco: Never   Vaping Use    Vaping Use: Never used   Substance and Sexual Activity    Alcohol use: Yes     Alcohol/week: 0.0 standard drinks     Comment: 2 DRINKS A DAY.     Drug use: No    Sexual activity: Not Currently     Partners: Female     Birth control/protection: None   Other Topics Concern    Not on file   Social History Narrative    Not on file     Social Determinants of Health     Financial Resource Strain: Not on file   Food Insecurity: Not on file   Transportation Needs: Not on file   Physical Activity: Not on file   Stress: Not on file   Social Connections: Not on file   Intimate Partner Violence: Not on file   Housing Stability: Not on file     Current Outpatient Medications on File Prior to Visit   Medication Sig Dispense Refill    atorvastatin (LIPITOR) 40 mg tablet 40 mg.      amiodarone (CORDARONE) 200 mg tablet 200 mg. ALPRAZolam (XANAX) 0.25 mg tablet TAKE ONE TABLET BY MOUTH THREE TIMES A DAY AS NEEDED FOR ANXIETY 75 Tablet 0    tamsulosin (FLOMAX) 0.4 mg capsule TAKE ONE CAPSULE BY MOUTH DAILY 90 Capsule 1    oxybutynin chloride XL (DITROPAN XL) 10 mg CR tablet Take 1 Tablet by mouth in the morning. 90 Tablet 1    oxybutynin chloride XL (DITROPAN XL) 10 mg CR tablet TAKE ONE TABLET BY MOUTH DAILY 90 Tablet 1    loperamide (Imodium A-D) 2 mg tablet Take 2 tablets at bedtime 180 Tablet 0    mirtazapine (REMERON) 15 mg tablet TAKE ONE TABLET BY MOUTH ONCE NIGHTLY 90 Tablet 1    OTHER Vesseclear      POLICOSANOL PO Take 10 mg by mouth. 2x daily      megestroL (MEGACE) 40 mg tablet Take 1 Tablet by mouth daily. 90 Tablet 1    metoprolol succinate (TOPROL-XL) 25 mg XL tablet Take 0.5 Tablets by mouth daily. isosorbide mononitrate (ISMO, MONOKET) 20 mg tablet Take 20 mg by mouth three (3) times daily. aspirin delayed-release 81 mg tablet Take 81 mg by mouth daily. FLUoxetine (PROzac) 20 mg capsule Take 20 mg by mouth daily. cilostazoL (PLETAL) 50 mg tablet TAKE ONE TABLET BY MOUTH TWICE A DAY; TAKE AN HALF HOUR BEFORE OR 2 HOURS AFTER BREAKFAST AND DINNER      calcium acetate,phosphat bind, (PHOSLO) 667 mg cap Take  by mouth three (3) times daily (with meals). glucosamine-chondroitin (ARTHX) 500-400 mg cap Take 1 Cap by mouth daily. ferrous sulfate 325 mg (65 mg iron) tablet Take 325 mg by mouth Daily (before breakfast). clopidogreL (PLAVIX) 75 mg tab Take 75 mg by mouth daily.       [DISCONTINUED] trimethoprim-sulfamethoxazole (BACTRIM DS, SEPTRA DS) 160-800 mg per tablet 1 Tablet. No current facility-administered medications on file prior to visit. No Known Allergies  ROS  ROS per HPI and past medical history   Objective  Physical Exam  Vitals and nursing note reviewed. Exam conducted with a chaperone present. HENT:      Head: Normocephalic. Skin:     General: Skin is warm and dry. Neurological:      Mental Status: He is alert. Comments: Expressive aphasia noted during office visit        Assessment & Plan      ICD-10-CM ICD-9-CM    1. Hospital discharge follow-up  Z09 V67.59       2. Status post CVA  Z86.73 V12.54 CBC WITH AUTOMATED DIFF      METABOLIC PANEL, COMPREHENSIVE      LIPID PANEL      3. History of UTI  Z87.440 V13.02 AMB POC URINALYSIS DIP STICK AUTO W/ MICRO       4. Alcohol abuse  F10.10 305.00       5. Urinary retention  R33.9 788.20       6. Benzodiazepine dependence (HCC)  F13.20 304.10       7. Encounter for smoking cessation counseling  Z71.6 V65.42      305.1       8. History of MI (myocardial infarction)  I25.2 412       9. Hypertension, unspecified type  I10 401.9 CBC WITH AUTOMATED DIFF      METABOLIC PANEL, COMPREHENSIVE      LIPID PANEL      10. Tobacco dependence  F17.200 305.1         Diagnoses and all orders for this visit:    1. Hospital discharge follow-up  Patient will be following up with neurology. He is with Dr. Salima Slaughter and will have stress test in January and has seen neurology but has no follow-up. He will continue to be adherent with his visits with the specialist    2. Status post CVA  -     CBC WITH AUTOMATED DIFF  -     METABOLIC PANEL, COMPREHENSIVE  -     LIPID PANEL  Obtaining updated CBC CMP and lipid for trending and will make treatment decisions when I get the results. 3. History of UTI  -     AMB POC URINALYSIS DIP STICK AUTO W/ MICRO   Repeat urinalysis to check status of UTI will make treatment decisions when I get the results.   It appears that patient does have a continued UTI and will treat with Macrobid. 4. Alcohol abuse  Discussed with patient the high risk of using alcohol and benzodiazepine at the same time. Patient was not receptive    5. Urinary retention  Discussed with patient and wife that perhaps not letting them replace the catheter was not a pitt decision and he could continue to have urinary retention and should be observed closely for ability to empty his bladder. 6. Benzodiazepine dependence ([address]  Discussed with patient the high risk of using alcohol and benzodiazepine at the same time. Patient was not receptive    7. Encounter for smoking cessation counseling  Patient will let me know when he has a quit date but has no intentions of quitting anytime soon. 8. History of MI (myocardial infarction)  Patient is followed by cardiology and electrophysiology and is scheduled for a nuclear stress test in January. He will continue to be adherent with his follow-ups with his specialist and we will support the plan of treatment. 9. Hypertension, unspecified type  -     CBC WITH AUTOMATED DIFF  -     METABOLIC PANEL, COMPREHENSIVE  -     LIPID PANEL  Obtaining updated CBC CMP and lipid for trending and will make treatment decisions when I get the results. I have gone over the instructions the patient should not take his Mohan Greulich unless his systolic is above 497 and wife voiced understanding. 10. Tobacco dependence  Patient will let me know when he has a quit date but expresses that he does not think he will be quitting anytime soon. Other orders  -     nitrofurantoin, macrocrystal-monohydrate, (Macrobid) 100 mg capsule; Take 1 Capsule by mouth two (2) times a day for 5 days.       Brie Erwin NP

## 2022-12-30 LAB
ALBUMIN SERPL-MCNC: 4.3 G/DL (ref 3.7–4.7)
ALBUMIN/GLOB SERPL: 2.2 {RATIO} (ref 1.2–2.2)
ALP SERPL-CCNC: 100 IU/L (ref 44–121)
ALT SERPL-CCNC: 15 IU/L (ref 0–44)
AST SERPL-CCNC: 22 IU/L (ref 0–40)
BASOPHILS # BLD AUTO: 0 X10E3/UL (ref 0–0.2)
BASOPHILS NFR BLD AUTO: 1 %
BILIRUB SERPL-MCNC: 0.5 MG/DL (ref 0–1.2)
BUN SERPL-MCNC: 16 MG/DL (ref 8–27)
BUN/CREAT SERPL: 10 (ref 10–24)
CALCIUM SERPL-MCNC: 9.6 MG/DL (ref 8.6–10.2)
CHLORIDE SERPL-SCNC: 97 MMOL/L (ref 96–106)
CHOLEST SERPL-MCNC: 112 MG/DL (ref 100–199)
CO2 SERPL-SCNC: 26 MMOL/L (ref 20–29)
CREAT SERPL-MCNC: 1.59 MG/DL (ref 0.76–1.27)
EGFR: 44 ML/MIN/1.73
EOSINOPHIL # BLD AUTO: 0.1 X10E3/UL (ref 0–0.4)
EOSINOPHIL NFR BLD AUTO: 1 %
ERYTHROCYTE [DISTWIDTH] IN BLOOD BY AUTOMATED COUNT: 11.9 % (ref 11.6–15.4)
GLOBULIN SER CALC-MCNC: 2 G/DL (ref 1.5–4.5)
GLUCOSE SERPL-MCNC: 125 MG/DL (ref 70–99)
HCT VFR BLD AUTO: 34.6 % (ref 37.5–51)
HDLC SERPL-MCNC: 60 MG/DL
HGB BLD-MCNC: 11.3 G/DL (ref 13–17.7)
IMM GRANULOCYTES # BLD AUTO: 0 X10E3/UL (ref 0–0.1)
IMM GRANULOCYTES NFR BLD AUTO: 0 %
LDLC SERPL CALC-MCNC: 34 MG/DL (ref 0–99)
LYMPHOCYTES # BLD AUTO: 0.9 X10E3/UL (ref 0.7–3.1)
LYMPHOCYTES NFR BLD AUTO: 15 %
MCH RBC QN AUTO: 33.1 PG (ref 26.6–33)
MCHC RBC AUTO-ENTMCNC: 32.7 G/DL (ref 31.5–35.7)
MCV RBC AUTO: 102 FL (ref 79–97)
MONOCYTES # BLD AUTO: 0.3 X10E3/UL (ref 0.1–0.9)
MONOCYTES NFR BLD AUTO: 6 %
NEUTROPHILS # BLD AUTO: 4.3 X10E3/UL (ref 1.4–7)
NEUTROPHILS NFR BLD AUTO: 77 %
PLATELET # BLD AUTO: 157 X10E3/UL (ref 150–450)
POTASSIUM SERPL-SCNC: 4.1 MMOL/L (ref 3.5–5.2)
PROT SERPL-MCNC: 6.3 G/DL (ref 6–8.5)
RBC # BLD AUTO: 3.41 X10E6/UL (ref 4.14–5.8)
SODIUM SERPL-SCNC: 139 MMOL/L (ref 134–144)
TRIGL SERPL-MCNC: 94 MG/DL (ref 0–149)
VLDLC SERPL CALC-MCNC: 18 MG/DL (ref 5–40)
WBC # BLD AUTO: 5.6 X10E3/UL (ref 3.4–10.8)

## 2022-12-31 NOTE — PROGRESS NOTES
You continue to have abnormalities in your CBC and you continue to be anemic. You also have some abnormalities in your kidney function this is also not new I would suggest avoiding medications like Aleve, Motrin and ibuprofen and decreasing alcohol intake. It does appear that you do have a urinary tract infection and I am awaiting the culture results. Your other labs are basically normal.  Some values may be minimally outside the  \"normal\" range but are not harmful or clinically significant. Please contact the office if you have questions or concerns. We will recheck all your labs at your next office visit.

## 2023-01-01 RX ORDER — WARFARIN SODIUM 3 MG/1
3 TABLET ORAL DAILY
Qty: 60 TABLET | Refills: 1 | Status: CANCELLED | OUTPATIENT
Start: 2023-01-01

## 2023-01-03 ENCOUNTER — TELEPHONE (OUTPATIENT)
Dept: FAMILY MEDICINE CLINIC | Age: 83
End: 2023-01-03

## 2023-01-03 DIAGNOSIS — R19.7 DIARRHEA, UNSPECIFIED TYPE: Primary | ICD-10-CM

## 2023-01-03 DIAGNOSIS — K59.00 CONSTIPATION, UNSPECIFIED CONSTIPATION TYPE: ICD-10-CM

## 2023-01-03 DIAGNOSIS — Z86.73 STATUS POST CVA: ICD-10-CM

## 2023-01-03 NOTE — TELEPHONE ENCOUNTER
Spoke to pt's wife who was concerned about pt's BP being low. She stated that PT was there working with him doing light exercises but when they checked his BP it was really low. She stated his BP was 100/39 pulse 49; 99/47; 100/49; 96/44. Pt is not complaining of any dizziness. She stated that he has been taking the Metoprolol and the Pletal daily. Advised her that both of these medications were discontinued at the hospital and he should not be taking them. She stated that she would stop them both and keep a check on his BP. I overheard P.T. stating BP is now 135/53 prior to them leaving. Any other medications that pt should hold while BP is low?

## 2023-01-07 ENCOUNTER — PATIENT MESSAGE (OUTPATIENT)
Dept: FAMILY MEDICINE CLINIC | Age: 83
End: 2023-01-07

## 2023-01-09 NOTE — TELEPHONE ENCOUNTER
Pt verbalized understanding of this. Pt stated that his wife is checking as well as PT and it had been running find gradually coming up.

## 2023-01-09 NOTE — TELEPHONE ENCOUNTER
----- Message from Regi Martin NP sent at 1/9/2023 11:23 AM EST -----  Regarding: gastroenterology  He is probably in need of a gastroenterology appointment. Not sure if the stent had anything to do with it. He will need to continue to treat symptomatically because there is not one med that will treat that cycle.  I am referring to [facility] at : [address] [place] [address]  Phone: [phone]  Please fax referral

## 2023-01-09 NOTE — TELEPHONE ENCOUNTER
Pt has been having difficulty going to the bathroom last couple weeks since hospital stent. Pt has been going from diarrhea to constipation. When Pt is constipated he will take miralax (pt's wife stated she is not sure if he is mixing right). When Pt has Diarrhea he is taking Imodium. Pt stated it is going in a cycle. Last Bowel movement was 2 days ago. Pt stated that they are at a lost for what to do.

## 2023-01-09 NOTE — TELEPHONE ENCOUNTER
I have looked at his meds and there are not other bp meds that need to be discontinued.   Xanax lowers blood pressure and we should consider weaning him off of this

## 2023-01-11 ENCOUNTER — TELEPHONE (OUTPATIENT)
Dept: FAMILY MEDICINE CLINIC | Age: 83
End: 2023-01-11

## 2023-01-11 NOTE — TELEPHONE ENCOUNTER
----- Message from Cade Campoverde NP sent at 1/11/2023 12:21 PM EST -----  Regarding: neurology referral  If I did not tell you please let him know that he has been referred and he needs to take whatever appointment they have and ask if there is a cancellation list and ask to be put on it

## 2023-01-12 ENCOUNTER — PATIENT MESSAGE (OUTPATIENT)
Dept: FAMILY MEDICINE CLINIC | Age: 83
End: 2023-01-12

## 2023-01-12 RX ORDER — ATORVASTATIN CALCIUM 40 MG/1
40 TABLET, FILM COATED ORAL
Qty: 90 TABLET | Refills: 1 | Status: SHIPPED | OUTPATIENT
Start: 2023-01-12

## 2023-01-23 ENCOUNTER — OFFICE VISIT (OUTPATIENT)
Dept: FAMILY MEDICINE CLINIC | Age: 83
End: 2023-01-23
Payer: MEDICARE

## 2023-01-23 VITALS
SYSTOLIC BLOOD PRESSURE: 120 MMHG | OXYGEN SATURATION: 97 % | HEART RATE: 43 BPM | DIASTOLIC BLOOD PRESSURE: 72 MMHG | HEIGHT: 71 IN | BODY MASS INDEX: 20.58 KG/M2 | TEMPERATURE: 97.6 F | WEIGHT: 147 LBS

## 2023-01-23 DIAGNOSIS — I25.10 CORONARY ARTERY DISEASE, UNSPECIFIED VESSEL OR LESION TYPE, UNSPECIFIED WHETHER ANGINA PRESENT, UNSPECIFIED WHETHER NATIVE OR TRANSPLANTED HEART: ICD-10-CM

## 2023-01-23 DIAGNOSIS — Z86.73 STATUS POST CVA: ICD-10-CM

## 2023-01-23 DIAGNOSIS — I10 HYPERTENSION, UNSPECIFIED TYPE: ICD-10-CM

## 2023-01-23 DIAGNOSIS — F13.20 BENZODIAZEPINE DEPENDENCE (HCC): ICD-10-CM

## 2023-01-23 DIAGNOSIS — I69.320 CVA, OLD, APHASIA: ICD-10-CM

## 2023-01-23 DIAGNOSIS — Z00.00 ENCOUNTER FOR MEDICARE ANNUAL WELLNESS EXAM: Primary | ICD-10-CM

## 2023-01-23 DIAGNOSIS — N18.30 STAGE 3 CHRONIC KIDNEY DISEASE, UNSPECIFIED WHETHER STAGE 3A OR 3B CKD (HCC): ICD-10-CM

## 2023-01-23 DIAGNOSIS — F41.9 ANXIETY: ICD-10-CM

## 2023-01-23 PROCEDURE — G8420 CALC BMI NORM PARAMETERS: HCPCS | Performed by: NURSE PRACTITIONER

## 2023-01-23 PROCEDURE — 3074F SYST BP LT 130 MM HG: CPT | Performed by: NURSE PRACTITIONER

## 2023-01-23 PROCEDURE — G8536 NO DOC ELDER MAL SCRN: HCPCS | Performed by: NURSE PRACTITIONER

## 2023-01-23 PROCEDURE — G8427 DOCREV CUR MEDS BY ELIG CLIN: HCPCS | Performed by: NURSE PRACTITIONER

## 2023-01-23 PROCEDURE — 3078F DIAST BP <80 MM HG: CPT | Performed by: NURSE PRACTITIONER

## 2023-01-23 PROCEDURE — 1123F ACP DISCUSS/DSCN MKR DOCD: CPT | Performed by: NURSE PRACTITIONER

## 2023-01-23 PROCEDURE — G0439 PPPS, SUBSEQ VISIT: HCPCS | Performed by: NURSE PRACTITIONER

## 2023-01-23 PROCEDURE — G8432 DEP SCR NOT DOC, RNG: HCPCS | Performed by: NURSE PRACTITIONER

## 2023-01-23 PROCEDURE — 1101F PT FALLS ASSESS-DOCD LE1/YR: CPT | Performed by: NURSE PRACTITIONER

## 2023-01-23 NOTE — PROGRESS NOTES
Chief Complaint   Patient presents with    Annual Wellness Visit     Medicare Wellness     1. \"Have you been to the ER, urgent care clinic since your last visit? Hospitalized since your last visit? \" No    2. \"Have you seen or consulted any other health care providers outside of the [address] [place] [address] since your last visit? \" No     3. For patients aged 39-70: Has the patient had a colonoscopy / FIT/ Cologuard? NA - based on age      If the patient is female:    4. For patients aged 41-77: Has the patient had a mammogram within the past 2 years? NA - based on age or sex      11. For patients aged 21-65: Has the patient had a pap smear?  NA - based on age or sex    3 most recent PHQ Screens 1/23/2023   Little interest or pleasure in doing things Several days   Feeling down, depressed, irritable, or hopeless Several days   Total Score PHQ 2 2

## 2023-01-23 NOTE — PROGRESS NOTES
Medicare Wellness Exam:    Chief Complaint   Patient presents with    Annual Wellness Visit     Medicare Wellness     he is a [de-identified]y.o. year old male who presents for evaluation for their Medicare Wellness Visit. His health screenings are as documented in the EMR. He is declining flu, shingles, and tetanus. His history is notable for CVA and he is got residual speech issues. He is being seen by speech therapy at home 2 times a week. His cardiologist put him on Eliquis and gave him a 30-day supply when they went to refill it was $400 a month and she is not able to do that. He has a follow-up appointment for an echocardiogram tomorrow and will be able discussed with his cardiologist at that time. Patient has never had an eye exam and is declining that also. Patient is not followed by neurology at this time. His wife states she is called and nobody can see him before April. I have encouraged her to take the first available appointment with a neurologist and asked to be put on cancellation list.  He is otherwise without complaint. Fall Screen is completed and assessed=yes  Depression Screen is completed and assessed=yes  Medication list reviewed and adjusted for accuracy=yes  Immunizations reviewed and updated=yes  Health/Preventative Screenings reviewed and updated=yes  ADL Functions reviewed=yes  See scanned medicare wellness documents for full details.      Patient Active Problem List    Diagnosis    CVA, old, aphasia    Status post CVA    Benzodiazepine dependence (HCC)    Bilateral leg pain    Chronic renal disease, stage III    Iron deficiency anemia secondary to inadequate dietary iron intake    Decreased GFR    Decreased appetite    Encounter for Medicare annual wellness exam    Unintentional weight loss    Other constipation    Anemia    Anxiety    Insomnia    Primary erectile dysfunction    Vitamin D deficiency    Impingement syndrome of left shoulder    Sprain of left rotator cuff capsule History of MI (myocardial infarction)    History of peripheral vascular disease    Hyperlipemia    Bilateral carotid bruits    HTN (hypertension)    CAD (coronary artery disease)       Reviewed PmHx, RxHx, FmHx, SocHx, AllgHx and updated and dated in the chart. ROS   ROS per HPI and patient's active problem list.    Objective:     Vitals:    01/23/23 0956   BP: 120/72   Pulse: (!) 43   Temp: 97.6 °F (36.4 °C)   TempSrc: Temporal   SpO2: 97%   Weight: 147 lb (66.7 kg)   Height: 5' 11\" (1.803 m)     Physical Exam  Vitals and nursing note reviewed. HENT:      Head: Normocephalic. Cardiovascular:      Rate and Rhythm: Normal rate and regular rhythm. Heart sounds: Normal heart sounds. Pulmonary:      Effort: Pulmonary effort is normal.      Breath sounds: Normal breath sounds. Abdominal:      General: Bowel sounds are normal.      Palpations: Abdomen is soft. Skin:     General: Skin is warm and dry. Neurological:      Comments: aphasia        Assessment/ Plan:   Diagnoses and all orders for this visit:    1. Encounter for Medicare annual wellness exam  His health screenings are as documented in the EMR. Please refer to nurse intake summary    2. CVA, old, aphasia  Patient receiving speech therapy twice a week. We will continue with the speech therapist and instructed to call neurologist to take the earliest appointment they have. 4. Hypertension, unspecified type  BP at goal today at 120/72. We will continue to maintain patient on current regimen at current dosage. 5. Benzodiazepine dependence ([facility] [address]  Patient has been on benzodiazepines for greater than 10 years. Uses it as needed to treat his anxiety. We will continue with the use of this medication at its present dosage. 6. Stage 3 chronic kidney disease, unspecified whether stage 3a or 3b CKD ([address]  Patient is followed by nephrology.   He will continue to be adherent with his visits with the specialist and we will support the plan of treatment. 7. Coronary artery disease, unspecified vessel or lesion type, unspecified whether angina present, unspecified whether native or transplanted heart  Patient is followed by cardiology. He will continue to be adherent with his visits and we will support the plan of treatment. 8. Anxiety  Patient has been on benzodiazepines for greater than 10 years. Uses it as needed to treat his anxiety. We will continue with the use of this medication at its present dosage.         -Pain evaluation performed in office  -Cognitive Screen performed in office  -Depression Screen, Fall risks (by up and go test)  and ADL functionality were addressed  -Medication list updated and reviewed for any changes   -A comprehensive review of medical issues and a plan was formulated  -End of life planning was addressed with pt   -Health Screenings for preventions were addressed and a plan was formulated  -Shingles Vaccine was recommended  -Discussed with patient cancer risk factors and appropriate screenings for age  -Patient evaluated for colonoscopy and referred if needed per screeing criteria  -Labs from previous visits were discussed with patient   -Discussed with patient diet and exercise and formulated a plan as needed  -An Advanced care plan was developed with the patient.  -Alcohol screening performed and was negative    -  Follow-up and Dispositions    Return in 6 months (on 7/23/2023) for F/U OF CHRONIC CONDITIONS/FASTING LABS AND cpe. I have discussed the diagnosis with the patient and the intended plan as seen in the above orders. The patient understands and agrees with the plan. The patient has received an after-visit summary and questions were answered concerning future plans.      Medication Side Effects and Warnings were discussed with patien  Patient Labs were reviewed and or requested  Patient Past Records were reviewed and or requested    There are no Patient Instructions on file for this visit.       Azeem Pillai NP

## 2023-01-30 ENCOUNTER — TELEPHONE (OUTPATIENT)
Dept: FAMILY MEDICINE CLINIC | Age: 83
End: 2023-01-30

## 2023-01-30 DIAGNOSIS — Z86.73 STATUS POST CVA: Primary | ICD-10-CM

## 2023-01-30 DIAGNOSIS — I69.320 CVA, OLD, APHASIA: ICD-10-CM

## 2023-01-30 NOTE — TELEPHONE ENCOUNTER
7 Nargis Briggs Therapist from [facility] is calling stating that she is moving out of state and patient needs to transfer to [facility] and they need a new order for speech therapy and home health services faxed to [phone]

## 2023-01-31 PROBLEM — I69.320 CVA, OLD, APHASIA: Status: ACTIVE | Noted: 2023-01-31

## 2023-01-31 PROBLEM — Z00.00 ENCOUNTER FOR MEDICARE ANNUAL WELLNESS EXAM: Status: ACTIVE | Noted: 2021-07-28

## 2023-02-03 DIAGNOSIS — F41.9 ANXIETY: ICD-10-CM

## 2023-02-04 RX ORDER — ALPRAZOLAM 0.25 MG/1
TABLET ORAL
Qty: 75 TABLET | Refills: 0 | Status: SHIPPED | OUTPATIENT
Start: 2023-02-04

## 2023-02-08 ENCOUNTER — TELEPHONE (OUTPATIENT)
Dept: FAMILY MEDICINE CLINIC | Age: 83
End: 2023-02-08

## 2023-02-08 NOTE — TELEPHONE ENCOUNTER
Monday I faxed the form along with demographic over to [facility]. I called and left a message with audra stating this and for her to give me a call back with any questions and I would refax their form with patient demographics again.

## 2023-02-08 NOTE — TELEPHONE ENCOUNTER
Reason for call: Reema with [facility] calling--they are requesting additional info in regards to the referral. It says different things all over the referral so they are wondering if this can be changed for clarification. They need additional info, such as office notes, demographics, and HMP.      Fax: [phone]    Is this a new problem: yes     Date of last appointment:  1/23/2023     Can we respond via [employer]: no    Best call back number: [phone]

## 2023-02-15 ENCOUNTER — TELEPHONE (OUTPATIENT)
Dept: FAMILY MEDICINE CLINIC | Age: 83
End: 2023-02-15

## 2023-02-15 NOTE — TELEPHONE ENCOUNTER
Luisito Tineo (speech therapist) is with patient at this time. Says his pulse is 38 and his BP is 140/48. She is concerned about his pulse.

## 2023-02-15 NOTE — TELEPHONE ENCOUNTER
ALICIA. No number for me to call. Pt last two visits has had low bp. Anything to advise Pt or Pt's wife?

## 2023-02-16 NOTE — TELEPHONE ENCOUNTER
----- Message from Williams Mooney NP sent at 2/16/2023  1:02 PM EST -----  Regarding: low pulse  Please have him call and get an appointment with his cardiologist.  This is a longstanding problem with the pulse but his blood pressure is better.   If he does not see cardiology let me know

## 2023-02-16 NOTE — TELEPHONE ENCOUNTER
Also ask him to make sure that he is not taking his metoprolol.   Please discontinue it if he is still taking it

## 2023-02-17 NOTE — TELEPHONE ENCOUNTER
Pt's wife informed us he did stop the metoprolol and they did call cardiologist and he is doing better as of today.  His bp was 108/53 and pulse 50

## 2023-03-02 PROBLEM — Z00.00 ENCOUNTER FOR MEDICARE ANNUAL WELLNESS EXAM: Status: RESOLVED | Noted: 2021-07-28 | Resolved: 2023-03-02

## 2023-03-15 DIAGNOSIS — F41.9 ANXIETY: ICD-10-CM

## 2023-03-15 RX ORDER — ALPRAZOLAM 0.25 MG/1
TABLET ORAL
Qty: 75 TABLET | Refills: 0 | Status: SHIPPED | OUTPATIENT
Start: 2023-03-15

## 2023-03-15 RX ORDER — ALPRAZOLAM 0.25 MG/1
TABLET ORAL
Qty: 75 TABLET | Refills: 0 | OUTPATIENT
Start: 2023-03-15

## 2023-03-20 ENCOUNTER — OFFICE VISIT (OUTPATIENT)
Dept: FAMILY MEDICINE CLINIC | Age: 83
End: 2023-03-20
Payer: MEDICARE

## 2023-03-20 VITALS
OXYGEN SATURATION: 100 % | RESPIRATION RATE: 16 BRPM | HEIGHT: 71 IN | BODY MASS INDEX: 21 KG/M2 | TEMPERATURE: 97.1 F | DIASTOLIC BLOOD PRESSURE: 72 MMHG | WEIGHT: 150 LBS | HEART RATE: 48 BPM | SYSTOLIC BLOOD PRESSURE: 122 MMHG

## 2023-03-20 DIAGNOSIS — R19.7 DIARRHEA, UNSPECIFIED TYPE: ICD-10-CM

## 2023-03-20 DIAGNOSIS — R94.4 DECREASED GFR: ICD-10-CM

## 2023-03-20 DIAGNOSIS — D50.8 IRON DEFICIENCY ANEMIA SECONDARY TO INADEQUATE DIETARY IRON INTAKE: ICD-10-CM

## 2023-03-20 DIAGNOSIS — E55.9 VITAMIN D DEFICIENCY: ICD-10-CM

## 2023-03-20 DIAGNOSIS — N18.30 STAGE 3 CHRONIC KIDNEY DISEASE, UNSPECIFIED WHETHER STAGE 3A OR 3B CKD (HCC): ICD-10-CM

## 2023-03-20 DIAGNOSIS — I69.320 CVA, OLD, APHASIA: ICD-10-CM

## 2023-03-20 DIAGNOSIS — K59.00 CONSTIPATION, UNSPECIFIED CONSTIPATION TYPE: ICD-10-CM

## 2023-03-20 DIAGNOSIS — E78.5 HYPERLIPIDEMIA, UNSPECIFIED HYPERLIPIDEMIA TYPE: ICD-10-CM

## 2023-03-20 DIAGNOSIS — F41.9 ANXIETY: ICD-10-CM

## 2023-03-20 DIAGNOSIS — I10 HYPERTENSION, UNSPECIFIED TYPE: ICD-10-CM

## 2023-03-20 DIAGNOSIS — Z86.73 STATUS POST CVA: ICD-10-CM

## 2023-03-20 DIAGNOSIS — D69.9 ANTICOAGULANT DISORDER (HCC): Primary | ICD-10-CM

## 2023-03-20 DIAGNOSIS — Z91.199 PATIENT NON ADHERENCE: ICD-10-CM

## 2023-03-20 PROCEDURE — G8427 DOCREV CUR MEDS BY ELIG CLIN: HCPCS | Performed by: NURSE PRACTITIONER

## 2023-03-20 PROCEDURE — 99214 OFFICE O/P EST MOD 30 MIN: CPT | Performed by: NURSE PRACTITIONER

## 2023-03-20 PROCEDURE — G8420 CALC BMI NORM PARAMETERS: HCPCS | Performed by: NURSE PRACTITIONER

## 2023-03-20 PROCEDURE — 3074F SYST BP LT 130 MM HG: CPT | Performed by: NURSE PRACTITIONER

## 2023-03-20 PROCEDURE — 3078F DIAST BP <80 MM HG: CPT | Performed by: NURSE PRACTITIONER

## 2023-03-20 PROCEDURE — 1101F PT FALLS ASSESS-DOCD LE1/YR: CPT | Performed by: NURSE PRACTITIONER

## 2023-03-20 PROCEDURE — G8432 DEP SCR NOT DOC, RNG: HCPCS | Performed by: NURSE PRACTITIONER

## 2023-03-20 PROCEDURE — G8536 NO DOC ELDER MAL SCRN: HCPCS | Performed by: NURSE PRACTITIONER

## 2023-03-20 PROCEDURE — 1123F ACP DISCUSS/DSCN MKR DOCD: CPT | Performed by: NURSE PRACTITIONER

## 2023-03-20 PROCEDURE — 85610 PROTHROMBIN TIME: CPT | Performed by: NURSE PRACTITIONER

## 2023-03-20 RX ORDER — WARFARIN 3 MG/1
3 TABLET ORAL DAILY
Qty: 60 TABLET | Refills: 1 | Status: SHIPPED | OUTPATIENT
Start: 2023-03-20

## 2023-03-20 NOTE — PROGRESS NOTES
Subjective  Chief Complaint   Patient presents with    Follow Up Chronic Condition     6mth, medication questions     HPI:  Goyo Lozano is a [de-identified] y.o. male. [de-identified]year old male presents with his wife for follow up of his chronic conditions which include anemia, anxiety, depression, hypercholesterolemia,insomnia and vitamin D deficiency. He hs not adherent with is medications and has dicontinued his cordarone, isosorbide, Phoslo and ferrous sulfate and states he quit taking them because he wanted to\". He also states that after his stroke his cardiologist prescribed eliquis and got samples and is out and when he went to get it refilled it was $550.00 for one month supply so he has not had it and is asking about coumadin and does not want to come in for PT/INR check and would like at home PT/INR machine.   He continues to be an every day smoker since the ago of 23 and has not desire for cessation and continues to use QHS    Past Medical History:   Diagnosis Date    Anemia 10/28/2020    Anxiety 10/28/2020    Basal cell carcinoma (BCC) of skin of face 7/28/2021    CAD (coronary artery disease) 1997    stents    Depression 2019    Ill-defined condition     High cholesterole    Insomnia 10/28/2020    Low hemoglobin     Peripheral arterial occlusive disease ([address] 11/16/2020    Primary erectile dysfunction 10/28/2020    Vitamin D deficiency 10/28/2020     Family History   Problem Relation Age of Onset    Heart Disease Mother     Migraines Mother      Social History     Socioeconomic History    Marital status:      Spouse name: Not on file    Number of children: Not on file    Years of education: Not on file    Highest education level: Not on file   Occupational History    Not on file   Tobacco Use    Smoking status: Every Day     Packs/day: 1.50     Years: 60.00     Pack years: 90.00     Types: Cigarettes    Smokeless tobacco: Never   Vaping Use    Vaping Use: Never used   Substance and Sexual Activity    Alcohol use: Yes     Alcohol/week: 0.0 standard drinks     Comment: 2 DRINKS A DAY. Drug use: No    Sexual activity: Not Currently     Partners: Female     Birth control/protection: None   Other Topics Concern    Not on file   Social History Narrative    Not on file     Social Determinants of Health     Financial Resource Strain: Low Risk     Difficulty of Paying Living Expenses: Not hard at all   Food Insecurity: No Food Insecurity    Worried About Running Out of Food in the Last Year: Never true    Ran Out of Food in the Last Year: Never true   Transportation Needs: Not on file   Physical Activity: Not on file   Stress: Not on file   Social Connections: Not on file   Intimate Partner Violence: Not on file   Housing Stability: Not on file     Current Outpatient Medications on File Prior to Visit   Medication Sig Dispense Refill    CASCARA SAGRADA PO Take  by mouth. ALPRAZolam (XANAX) 0.25 mg tablet TAKE ONE TABLET BY MOUTH THREE TIMES A DAY AS NEEDED FOR ANXIETY 75 Tablet 0    atorvastatin (LIPITOR) 40 mg tablet Take 1 Tablet by mouth nightly. 90 Tablet 1    oxybutynin chloride XL (DITROPAN XL) 10 mg CR tablet TAKE ONE TABLET BY MOUTH DAILY 90 Tablet 1    POLICOSANOL PO Take 10 mg by mouth. 2x daily      metoprolol succinate (TOPROL-XL) 25 mg XL tablet Take 0.5 Tablets by mouth daily. aspirin delayed-release 81 mg tablet Take 81 mg by mouth daily. glucosamine-chondroitin (ARTHX) 500-400 mg cap Take 1 Cap by mouth daily. No current facility-administered medications on file prior to visit. No Known Allergies  ROS  ROS per HPI and PMH      Objective  Physical Exam  Vitals and nursing note reviewed. HENT:      Head: Normocephalic. Cardiovascular:      Rate and Rhythm: Normal rate and regular rhythm. Heart sounds: Normal heart sounds. Pulmonary:      Effort: Pulmonary effort is normal.      Breath sounds: Normal breath sounds.    Abdominal:      General: Bowel sounds are normal.      Palpations: Abdomen is soft. Skin:     General: Skin is warm and dry. Neurological:      Mental Status: He is alert and oriented to person, place, and time. Comments: Word finding difficulties continue CVA with continued home PT   Psychiatric:         Mood and Affect: Mood normal.         Behavior: Behavior normal.         Thought Content: Thought content normal.         Judgment: Judgment normal.        Assessment & Plan      ICD-10-CM ICD-9-CM    1. Anticoagulant disorder (HCC)  D69.9 286.9 AMB POC PT/INR      AMB SUPPLY ORDER      2. Status post CVA  Z86.73 V12.54       3. Vitamin D deficiency  E55.9 268.9 VITAMIN D, 25 HYDROXY      4. Iron deficiency anemia secondary to inadequate dietary iron intake  D50.8 280.1 CBC WITH AUTOMATED DIFF      5. Decreased GFR  R94.4 794.4       6. Stage 3 chronic kidney disease, unspecified whether stage 3a or 3b CKD ([facility])  N08.78 811.9 METABOLIC PANEL, COMPREHENSIVE      7. Hyperlipidemia, unspecified hyperlipidemia type  E78.5 272.4 LIPID PANEL      8. Patient non adherence  Z91.199 V15.81       9. Diarrhea, unspecified type  R19.7 787.91 REFERRAL TO GASTROENTEROLOGY      10. Constipation, unspecified constipation type  K59.00 564.00 REFERRAL TO GASTROENTEROLOGY      11. Hypertension, unspecified type  I10 401.9       12. CVA, old, aphasia  I69.320 438.11       15. Anxiety  F41.9 300.00         Diagnoses and all orders for this visit:    1. Anticoagulant disorder (HCC)  -     AMB POC PT/INR  -     AMB SUPPLY ORDER  We will put in an order for home PT/INR machine. Patient will be transition to Coumadin and we will use this as our method of monitoring. PT/INR today in the office subtherapeutic at 1.0. Patient put on 3 mg tablets daily and will return in 1 week for reevaluation. 2. Status post CVA  Patient continues with word finding/difficulties with aphasia. He continues with home health speech therapy. PT/INR to be performed today in the office and it is 1.0.   Patient started on a Coumadin dose of 3 mg daily and will return in a week for reevaluation. 3. Vitamin D deficiency  -     VITAMIN D, 25 HYDROXY  Obtaining updated vitamin D for trending and will make supplementation decisions when I get the results. 4. Iron deficiency anemia secondary to inadequate dietary iron intake  -     CBC WITH AUTOMATED DIFF  Obtaining updated CBC for trending and will make treatment decisions when I get the results. 5. Decreased GFR  Obtaining updated CMP for trending and will make treatment decisions when I get the results    6. Stage 3 chronic kidney disease, unspecified whether stage 3a or 3b CKD ([facility]  -     METABOLIC PANEL, COMPREHENSIVE  Obtaining updated CMP for trending and will make treatment decisions when I get the results    7. Hyperlipidemia, unspecified hyperlipidemia type  -     LIPID PANEL  Obtaining updated lipid panel for trending and will make treatment decisions when I get the results. 8. Patient non adherence  Patient has stopped several of his medications because \"he wanted to\". Patient aware of risks of discontinuing the medications that he has and voices understanding    9. Diarrhea, unspecified type  -     REFERRAL TO GASTROENTEROLOGY  Referral to gastroenterology for GI problems for further evaluation and treatment    10. Constipation, unspecified constipation type    -     REFERRAL TO GASTROENTEROLOGY  Referral to gastroenterology for GI problems for further evaluation and treatment. 11. Hypertension, unspecified type  Blood pressure is at goal today at 122/72. We will maintain patient on current regimen at current dosage. 15. CVA, old, aphasia  Continuing home speech therapy. We will continue to monitor the notes per progress. 13. Anxiety  Patient is on a as needed benzodiazepine. We have talked about the risks of being on a benzodiazepine long-term patient understands risks. We will maintain on current medication at current dosage.     Other orders  -     warfarin (COUMADIN) 3 mg tablet; Take 1 Tablet by mouth daily.       Yudi Scott NP

## 2023-03-20 NOTE — PROGRESS NOTES
Chief Complaint   Patient presents with    Follow Up Chronic Condition     6mth, medication questions     3 most recent PHQ Screens 1/23/2023   Little interest or pleasure in doing things Several days   Feeling down, depressed, irritable, or hopeless Several days   Total Score PHQ 2 2     1. \"Have you been to the ER, urgent care clinic since your last visit? Hospitalized since your last visit? \" No    2. \"Have you seen or consulted any other health care providers outside of the [address] [place] [address] since your last visit? \" No     3. For patients aged 39-70: Has the patient had a colonoscopy / FIT/ Cologuard? NA - based on age      If the patient is female:    4. For patients aged 41-77: Has the patient had a mammogram within the past 2 years? NA - based on age or sex      11. For patients aged 21-65: Has the patient had a pap smear? NA - based on age or sex  Visit Vitals  /72 (BP 1 Location: Left upper arm, BP Patient Position: Sitting, BP Cuff Size: Large adult)   Pulse (!) 48   Temp 97.1 °F (36.2 °C) (Temporal)   Resp 16   Ht 5' 11\" (1.803 m)   Wt 150 lb (68 kg)   SpO2 100%   BMI 20.92 kg/m²     Fall Risk Assessment, last 12 mths 1/23/2023   Able to walk? Yes   Fall in past 12 months? 0   Do you feel unsteady? 0   Are you worried about falling 0   Number of falls in past 12 months -   Fall with injury?  -

## 2023-03-21 LAB
25(OH)D3+25(OH)D2 SERPL-MCNC: 43.9 NG/ML (ref 30–100)
ALBUMIN SERPL-MCNC: 4.5 G/DL (ref 3.7–4.7)
ALBUMIN/GLOB SERPL: 2.4 {RATIO} (ref 1.2–2.2)
ALP SERPL-CCNC: 110 IU/L (ref 44–121)
ALT SERPL-CCNC: 17 IU/L (ref 0–44)
AST SERPL-CCNC: 22 IU/L (ref 0–40)
BASOPHILS # BLD AUTO: 0 X10E3/UL (ref 0–0.2)
BASOPHILS NFR BLD AUTO: 1 %
BILIRUB SERPL-MCNC: 0.5 MG/DL (ref 0–1.2)
BUN SERPL-MCNC: 16 MG/DL (ref 8–27)
BUN/CREAT SERPL: 11 (ref 10–24)
CALCIUM SERPL-MCNC: 10.2 MG/DL (ref 8.6–10.2)
CHLORIDE SERPL-SCNC: 99 MMOL/L (ref 96–106)
CHOLEST SERPL-MCNC: 118 MG/DL (ref 100–199)
CO2 SERPL-SCNC: 26 MMOL/L (ref 20–29)
CREAT SERPL-MCNC: 1.41 MG/DL (ref 0.76–1.27)
EGFRCR SERPLBLD CKD-EPI 2021: 50 ML/MIN/1.73
EOSINOPHIL # BLD AUTO: 0.1 X10E3/UL (ref 0–0.4)
EOSINOPHIL NFR BLD AUTO: 2 %
ERYTHROCYTE [DISTWIDTH] IN BLOOD BY AUTOMATED COUNT: 12.1 % (ref 11.6–15.4)
GLOBULIN SER CALC-MCNC: 1.9 G/DL (ref 1.5–4.5)
GLUCOSE SERPL-MCNC: 83 MG/DL (ref 70–99)
HCT VFR BLD AUTO: 41.6 % (ref 37.5–51)
HDLC SERPL-MCNC: 60 MG/DL
HGB BLD-MCNC: 13.8 G/DL (ref 13–17.7)
IMM GRANULOCYTES # BLD AUTO: 0 X10E3/UL (ref 0–0.1)
IMM GRANULOCYTES NFR BLD AUTO: 0 %
LDLC SERPL CALC-MCNC: 37 MG/DL (ref 0–99)
LYMPHOCYTES # BLD AUTO: 1 X10E3/UL (ref 0.7–3.1)
LYMPHOCYTES NFR BLD AUTO: 20 %
MCH RBC QN AUTO: 33.7 PG (ref 26.6–33)
MCHC RBC AUTO-ENTMCNC: 33.2 G/DL (ref 31.5–35.7)
MCV RBC AUTO: 102 FL (ref 79–97)
MONOCYTES # BLD AUTO: 0.5 X10E3/UL (ref 0.1–0.9)
MONOCYTES NFR BLD AUTO: 11 %
NEUTROPHILS # BLD AUTO: 3.3 X10E3/UL (ref 1.4–7)
NEUTROPHILS NFR BLD AUTO: 66 %
PLATELET # BLD AUTO: 151 X10E3/UL (ref 150–450)
POTASSIUM SERPL-SCNC: 4.8 MMOL/L (ref 3.5–5.2)
PROT SERPL-MCNC: 6.4 G/DL (ref 6–8.5)
RBC # BLD AUTO: 4.1 X10E6/UL (ref 4.14–5.8)
SODIUM SERPL-SCNC: 140 MMOL/L (ref 134–144)
TRIGL SERPL-MCNC: 118 MG/DL (ref 0–149)
VLDLC SERPL CALC-MCNC: 21 MG/DL (ref 5–40)
WBC # BLD AUTO: 5 X10E3/UL (ref 3.4–10.8)

## 2023-03-22 LAB
INR BLD: 1
PT POC: 12.2 SECONDS
VALID INTERNAL CONTROL?: YES

## 2023-03-23 NOTE — PROGRESS NOTES
There are some abnormalities in your CBC. These are not new changes. But they are stable and we will continue to monitor. You also have some abnormalities in your kidney labs these 2 are not new and that they are actually improved so we will continue to monitor those 2. Your other labs are basically normal.  Some values may be minimally outside the  \"normal\" range but are not harmful or clinically significant. Please contact the office if you have questions or concerns. We will recheck all your labs at your next office visit.

## 2023-03-29 RX ORDER — BUPROPION HYDROCHLORIDE 150 MG/1
150 TABLET ORAL DAILY
Qty: 30 TABLET | Refills: 0 | Status: SHIPPED | OUTPATIENT
Start: 2023-03-29

## 2023-04-05 RX ORDER — ATORVASTATIN CALCIUM 40 MG/1
40 TABLET, FILM COATED ORAL
Qty: 90 TABLET | Refills: 1 | Status: SHIPPED
Start: 2023-04-05

## 2023-04-06 ENCOUNTER — CLINICAL SUPPORT (OUTPATIENT)
Dept: FAMILY MEDICINE CLINIC | Age: 83
End: 2023-04-06
Payer: MEDICARE

## 2023-04-06 PROCEDURE — 85610 PROTHROMBIN TIME: CPT | Performed by: NURSE PRACTITIONER

## 2023-04-06 PROCEDURE — 93793 ANTICOAG MGMT PT WARFARIN: CPT | Performed by: NURSE PRACTITIONER

## 2023-04-13 ENCOUNTER — HOSPITAL ENCOUNTER (INPATIENT)
Age: 83
LOS: 2 days | Discharge: HOME HEALTH | DRG: 092 | End: 2023-04-15
Attending: EMERGENCY MEDICINE | Admitting: INTERNAL MEDICINE
Payer: MEDICARE

## 2023-04-13 ENCOUNTER — APPOINTMENT (OUTPATIENT)
Dept: CT IMAGING | Age: 83
DRG: 092 | End: 2023-04-13
Attending: EMERGENCY MEDICINE
Payer: MEDICARE

## 2023-04-13 DIAGNOSIS — R47.01 APHASIA: Primary | ICD-10-CM

## 2023-04-13 PROBLEM — I63.9 STROKE (CEREBRUM) (HCC): Status: ACTIVE | Noted: 2023-04-13

## 2023-04-13 PROBLEM — N18.30 CKD (CHRONIC KIDNEY DISEASE) STAGE 3, GFR 30-59 ML/MIN (HCC): Status: ACTIVE | Noted: 2022-05-11

## 2023-04-13 PROBLEM — T45.515A WARFARIN-INDUCED COAGULOPATHY (HCC): Status: ACTIVE | Noted: 2023-03-20

## 2023-04-13 PROBLEM — I48.0 PAF (PAROXYSMAL ATRIAL FIBRILLATION) (HCC): Status: ACTIVE | Noted: 2023-04-13

## 2023-04-13 PROBLEM — I63.9 ACUTE CVA (CEREBROVASCULAR ACCIDENT) (HCC): Status: ACTIVE | Noted: 2023-04-13

## 2023-04-13 PROBLEM — D68.32 WARFARIN-INDUCED COAGULOPATHY (HCC): Status: ACTIVE | Noted: 2023-03-20

## 2023-04-13 LAB
ALBUMIN SERPL-MCNC: 4.2 G/DL (ref 3.5–5.2)
ALBUMIN/GLOB SERPL: 1.8 (ref 1.1–2.2)
ALP SERPL-CCNC: 124 U/L (ref 40–129)
ALT SERPL-CCNC: 18 U/L (ref 10–50)
ANION GAP SERPL CALC-SCNC: 9 MMOL/L (ref 5–15)
APTT PPP: 64.1 SEC (ref 21.2–34.1)
AST SERPL-CCNC: 24 U/L (ref 10–50)
BASOPHILS # BLD: 0.1 K/UL (ref 0–1)
BASOPHILS NFR BLD: 1 % (ref 0–1)
BILIRUB SERPL-MCNC: 0.5 MG/DL (ref 0.2–1)
BUN SERPL-MCNC: 21 MG/DL (ref 8–23)
BUN/CREAT SERPL: 14 (ref 12–20)
CALCIUM SERPL-MCNC: 9.8 MG/DL (ref 8.8–10.2)
CHLORIDE SERPL-SCNC: 104 MMOL/L (ref 98–107)
CO2 SERPL-SCNC: 32 MMOL/L (ref 22–29)
COMMENT, HOLDF: NORMAL
CREAT SERPL-MCNC: 1.51 MG/DL (ref 0.7–1.2)
DIFFERENTIAL METHOD BLD: ABNORMAL
EOSINOPHIL # BLD: 0.2 K/UL (ref 0–0.4)
EOSINOPHIL NFR BLD: 4 % (ref 0–7)
ERYTHROCYTE [DISTWIDTH] IN BLOOD BY AUTOMATED COUNT: 13.5 % (ref 11.5–14.5)
GLOBULIN SER CALC-MCNC: 2.3 G/DL (ref 2–4)
GLUCOSE BLD STRIP.AUTO-MCNC: 113 MG/DL (ref 65–117)
GLUCOSE SERPL-MCNC: 94 MG/DL (ref 65–100)
HCT VFR BLD AUTO: 40.9 % (ref 36.6–50.3)
HGB BLD-MCNC: 13.4 G/DL (ref 12.1–17)
IMM GRANULOCYTES # BLD AUTO: 0 K/UL (ref 0–0.04)
IMM GRANULOCYTES NFR BLD AUTO: 0 % (ref 0–0.5)
INR PPP: 7.3 (ref 0.9–1.1)
LYMPHOCYTES # BLD: 1.3 K/UL (ref 0.8–3.5)
LYMPHOCYTES NFR BLD: 27 % (ref 12–49)
MCH RBC QN AUTO: 33.8 PG (ref 26–34)
MCHC RBC AUTO-ENTMCNC: 32.8 G/DL (ref 30–36.5)
MCV RBC AUTO: 103.3 FL (ref 80–99)
MONOCYTES # BLD: 0.6 K/UL (ref 0–1)
MONOCYTES NFR BLD: 12 % (ref 5–13)
NEUTS SEG # BLD: 2.7 K/UL (ref 1.8–8)
NEUTS SEG NFR BLD: 56 % (ref 32–75)
NRBC # BLD: 0 K/UL (ref 0–0.01)
NRBC BLD-RTO: 0 PER 100 WBC
PLATELET # BLD AUTO: 165 K/UL (ref 150–400)
PMV BLD AUTO: 10 FL (ref 8.9–12.9)
POTASSIUM SERPL-SCNC: 4.2 MMOL/L (ref 3.5–5.1)
PROT SERPL-MCNC: 6.5 G/DL (ref 6.4–8.3)
PROTHROMBIN TIME: 60.4 SEC (ref 11.9–14.6)
RBC # BLD AUTO: 3.96 M/UL (ref 4.1–5.7)
SAMPLES BEING HELD,HOLD: NORMAL
SERVICE CMNT-IMP: NORMAL
SODIUM SERPL-SCNC: 145 MMOL/L (ref 136–145)
THERAPEUTIC RANGE,PTTT: ABNORMAL SECS (ref 82–109)
WBC # BLD AUTO: 4.9 K/UL (ref 4.1–11.1)

## 2023-04-13 PROCEDURE — 74011250636 HC RX REV CODE- 250/636: Performed by: INTERNAL MEDICINE

## 2023-04-13 PROCEDURE — 70498 CT ANGIOGRAPHY NECK: CPT

## 2023-04-13 PROCEDURE — 65270000046 HC RM TELEMETRY

## 2023-04-13 PROCEDURE — 93005 ELECTROCARDIOGRAM TRACING: CPT

## 2023-04-13 PROCEDURE — 74011250637 HC RX REV CODE- 250/637: Performed by: INTERNAL MEDICINE

## 2023-04-13 PROCEDURE — 85025 COMPLETE CBC W/AUTO DIFF WBC: CPT

## 2023-04-13 PROCEDURE — 74011000636 HC RX REV CODE- 636: Performed by: EMERGENCY MEDICINE

## 2023-04-13 PROCEDURE — 82962 GLUCOSE BLOOD TEST: CPT

## 2023-04-13 PROCEDURE — 70450 CT HEAD/BRAIN W/O DYE: CPT

## 2023-04-13 PROCEDURE — 85730 THROMBOPLASTIN TIME PARTIAL: CPT

## 2023-04-13 PROCEDURE — 99285 EMERGENCY DEPT VISIT HI MDM: CPT

## 2023-04-13 PROCEDURE — 85610 PROTHROMBIN TIME: CPT

## 2023-04-13 PROCEDURE — 80053 COMPREHEN METABOLIC PANEL: CPT

## 2023-04-13 PROCEDURE — 36415 COLL VENOUS BLD VENIPUNCTURE: CPT

## 2023-04-13 RX ORDER — ASPIRIN 81 MG/1
81 TABLET ORAL DAILY
Status: DISCONTINUED | OUTPATIENT
Start: 2023-04-14 | End: 2023-04-15 | Stop reason: HOSPADM

## 2023-04-13 RX ORDER — ALPRAZOLAM 0.25 MG/1
0.25 TABLET ORAL
Status: DISCONTINUED | OUTPATIENT
Start: 2023-04-13 | End: 2023-04-15 | Stop reason: HOSPADM

## 2023-04-13 RX ORDER — BUPROPION HYDROCHLORIDE 150 MG/1
150 TABLET ORAL DAILY
Status: DISCONTINUED | OUTPATIENT
Start: 2023-04-14 | End: 2023-04-15 | Stop reason: HOSPADM

## 2023-04-13 RX ORDER — SODIUM CHLORIDE 9 MG/ML
75 INJECTION, SOLUTION INTRAVENOUS CONTINUOUS
Status: DISCONTINUED | OUTPATIENT
Start: 2023-04-13 | End: 2023-04-14

## 2023-04-13 RX ORDER — ACETAMINOPHEN 325 MG/1
650 TABLET ORAL
Status: DISCONTINUED | OUTPATIENT
Start: 2023-04-13 | End: 2023-04-15 | Stop reason: HOSPADM

## 2023-04-13 RX ORDER — ACETAMINOPHEN 650 MG/1
650 SUPPOSITORY RECTAL
Status: DISCONTINUED | OUTPATIENT
Start: 2023-04-13 | End: 2023-04-15 | Stop reason: HOSPADM

## 2023-04-13 RX ORDER — LABETALOL HCL 20 MG/4 ML
5 SYRINGE (ML) INTRAVENOUS
Status: DISCONTINUED | OUTPATIENT
Start: 2023-04-13 | End: 2023-04-15 | Stop reason: HOSPADM

## 2023-04-13 RX ORDER — ATORVASTATIN CALCIUM 20 MG/1
40 TABLET, FILM COATED ORAL
Status: DISCONTINUED | OUTPATIENT
Start: 2023-04-13 | End: 2023-04-15 | Stop reason: HOSPADM

## 2023-04-13 RX ADMIN — IOPAMIDOL 100 ML: 755 INJECTION, SOLUTION INTRAVENOUS at 15:00

## 2023-04-13 RX ADMIN — ATORVASTATIN CALCIUM 40 MG: 20 TABLET, FILM COATED ORAL at 21:41

## 2023-04-13 RX ADMIN — SODIUM CHLORIDE 75 ML/HR: 9 INJECTION, SOLUTION INTRAVENOUS at 21:10

## 2023-04-14 ENCOUNTER — APPOINTMENT (OUTPATIENT)
Dept: NON INVASIVE DIAGNOSTICS | Age: 83
DRG: 092 | End: 2023-04-14
Attending: INTERNAL MEDICINE
Payer: MEDICARE

## 2023-04-14 ENCOUNTER — APPOINTMENT (OUTPATIENT)
Dept: MRI IMAGING | Age: 83
DRG: 092 | End: 2023-04-14
Attending: INTERNAL MEDICINE
Payer: MEDICARE

## 2023-04-14 LAB
CHOLEST SERPL-MCNC: 100 MG/DL
ECHO AO ASC DIAM: 3.6 CM
ECHO AO ASCENDING AORTA INDEX: 1.88 CM/M2
ECHO AR MAX VEL PISA: 3.3 M/S
ECHO AV AREA PEAK VELOCITY: 2 CM2
ECHO AV AREA PEAK VELOCITY: 2 CM2
ECHO AV AREA VTI: 1.9 CM2
ECHO AV AREA/BSA VTI: 1 CM2/M2
ECHO AV MEAN GRADIENT: 3 MMHG
ECHO AV MEAN VELOCITY: 0.8 M/S
ECHO AV PEAK GRADIENT: 4 MMHG
ECHO AV PEAK GRADIENT: 5 MMHG
ECHO AV PEAK VELOCITY: 1 M/S
ECHO AV PEAK VELOCITY: 1.1 M/S
ECHO AV REGURGITANT PHT: 623.6 MILLISECOND
ECHO AV VTI: 24.6 CM
ECHO LA DIAMETER INDEX: 1.78 CM/M2
ECHO LA DIAMETER: 3.4 CM
ECHO LA VOL 2C: 69 ML (ref 18–58)
ECHO LA VOL 4C: 77 ML (ref 18–58)
ECHO LA VOL BP: 75 ML (ref 18–58)
ECHO LA VOL/BSA BIPLANE: 39 ML/M2 (ref 16–34)
ECHO LA VOLUME AREA LENGTH: 79 ML
ECHO LA VOLUME INDEX A2C: 36 ML/M2 (ref 16–34)
ECHO LA VOLUME INDEX A4C: 40 ML/M2 (ref 16–34)
ECHO LA VOLUME INDEX AREA LENGTH: 41 ML/M2 (ref 16–34)
ECHO LV EDV A2C: 178 ML
ECHO LV EDV A4C: 152 ML
ECHO LV EDV BP: 178 ML (ref 67–155)
ECHO LV EDV INDEX A4C: 80 ML/M2
ECHO LV EDV INDEX BP: 93 ML/M2
ECHO LV EDV NDEX A2C: 93 ML/M2
ECHO LV EJECTION FRACTION A2C: 16 %
ECHO LV EJECTION FRACTION A4C: 35 %
ECHO LV EJECTION FRACTION BIPLANE: 29 % (ref 55–100)
ECHO LV ESV A2C: 150 ML
ECHO LV ESV A4C: 99 ML
ECHO LV ESV BP: 127 ML (ref 22–58)
ECHO LV ESV INDEX A2C: 79 ML/M2
ECHO LV ESV INDEX A4C: 52 ML/M2
ECHO LV ESV INDEX BP: 66 ML/M2
ECHO LV FRACTIONAL SHORTENING: 13 % (ref 28–44)
ECHO LV INTERNAL DIMENSION DIASTOLE INDEX: 3.19 CM/M2
ECHO LV INTERNAL DIMENSION DIASTOLIC: 6.1 CM (ref 4.2–5.9)
ECHO LV INTERNAL DIMENSION SYSTOLIC INDEX: 2.77 CM/M2
ECHO LV INTERNAL DIMENSION SYSTOLIC: 5.3 CM
ECHO LV IVSD: 0.8 CM (ref 0.6–1)
ECHO LV MASS 2D: 237.7 G (ref 88–224)
ECHO LV MASS INDEX 2D: 124.5 G/M2 (ref 49–115)
ECHO LV POSTERIOR WALL DIASTOLIC: 1.1 CM (ref 0.6–1)
ECHO LV RELATIVE WALL THICKNESS RATIO: 0.36
ECHO LVOT AREA: 3.1 CM2
ECHO LVOT AV VTI INDEX: 0.59
ECHO LVOT DIAM: 2 CM
ECHO LVOT MEAN GRADIENT: 1 MMHG
ECHO LVOT PEAK GRADIENT: 2 MMHG
ECHO LVOT PEAK VELOCITY: 0.7 M/S
ECHO LVOT STROKE VOLUME INDEX: 24 ML/M2
ECHO LVOT SV: 45.8 ML
ECHO LVOT VTI: 14.6 CM
ECHO RV INTERNAL DIMENSION: 2.8 CM
EST. AVERAGE GLUCOSE BLD GHB EST-MCNC: 114 MG/DL
HBA1C MFR BLD: 5.6 % (ref 4–5.6)
HDLC SERPL-MCNC: 58 MG/DL
HDLC SERPL: 1.7 (ref 0–5)
INR PPP: 7.5 (ref 0.9–1.1)
INR PPP: 9.7 (ref 0.9–1.1)
LDLC SERPL CALC-MCNC: 22.6 MG/DL (ref 0–100)
PROTHROMBIN TIME: 69.3 SEC (ref 9–11.1)
PROTHROMBIN TIME: 87.4 SEC (ref 9–11.1)
TRIGL SERPL-MCNC: 97 MG/DL (ref ?–150)
VLDLC SERPL CALC-MCNC: 19.4 MG/DL

## 2023-04-14 PROCEDURE — 83036 HEMOGLOBIN GLYCOSYLATED A1C: CPT

## 2023-04-14 PROCEDURE — 93321 DOPPLER ECHO F-UP/LMTD STD: CPT | Performed by: INTERNAL MEDICINE

## 2023-04-14 PROCEDURE — 97165 OT EVAL LOW COMPLEX 30 MIN: CPT

## 2023-04-14 PROCEDURE — 80061 LIPID PANEL: CPT

## 2023-04-14 PROCEDURE — 99223 1ST HOSP IP/OBS HIGH 75: CPT | Performed by: PSYCHIATRY & NEUROLOGY

## 2023-04-14 PROCEDURE — 92610 EVALUATE SWALLOWING FUNCTION: CPT

## 2023-04-14 PROCEDURE — 97116 GAIT TRAINING THERAPY: CPT

## 2023-04-14 PROCEDURE — 85610 PROTHROMBIN TIME: CPT

## 2023-04-14 PROCEDURE — 93308 TTE F-UP OR LMTD: CPT | Performed by: INTERNAL MEDICINE

## 2023-04-14 PROCEDURE — 93308 TTE F-UP OR LMTD: CPT

## 2023-04-14 PROCEDURE — 36415 COLL VENOUS BLD VENIPUNCTURE: CPT

## 2023-04-14 PROCEDURE — 65270000046 HC RM TELEMETRY

## 2023-04-14 PROCEDURE — 74011250636 HC RX REV CODE- 250/636: Performed by: INTERNAL MEDICINE

## 2023-04-14 PROCEDURE — 93325 DOPPLER ECHO COLOR FLOW MAPG: CPT | Performed by: INTERNAL MEDICINE

## 2023-04-14 PROCEDURE — 92523 SPEECH SOUND LANG COMPREHEN: CPT

## 2023-04-14 PROCEDURE — 97535 SELF CARE MNGMENT TRAINING: CPT

## 2023-04-14 PROCEDURE — 74011250637 HC RX REV CODE- 250/637: Performed by: INTERNAL MEDICINE

## 2023-04-14 PROCEDURE — 70551 MRI BRAIN STEM W/O DYE: CPT

## 2023-04-14 PROCEDURE — 97530 THERAPEUTIC ACTIVITIES: CPT

## 2023-04-14 PROCEDURE — 97161 PT EVAL LOW COMPLEX 20 MIN: CPT

## 2023-04-14 RX ADMIN — SODIUM CHLORIDE 75 ML/HR: 9 INJECTION, SOLUTION INTRAVENOUS at 09:01

## 2023-04-14 RX ADMIN — ALPRAZOLAM 0.25 MG: 0.25 TABLET ORAL at 19:49

## 2023-04-14 RX ADMIN — Medication 5 MG: at 03:31

## 2023-04-14 RX ADMIN — BUPROPION HYDROCHLORIDE 150 MG: 150 TABLET, EXTENDED RELEASE ORAL at 09:00

## 2023-04-14 RX ADMIN — ATORVASTATIN CALCIUM 40 MG: 20 TABLET, FILM COATED ORAL at 21:48

## 2023-04-15 VITALS
HEART RATE: 60 BPM | SYSTOLIC BLOOD PRESSURE: 145 MMHG | HEIGHT: 71 IN | BODY MASS INDEX: 22.19 KG/M2 | WEIGHT: 158.51 LBS | TEMPERATURE: 97 F | RESPIRATION RATE: 24 BRPM | DIASTOLIC BLOOD PRESSURE: 66 MMHG | OXYGEN SATURATION: 97 %

## 2023-04-15 LAB
ANION GAP SERPL CALC-SCNC: 1 MMOL/L (ref 5–15)
BASOPHILS # BLD: 0 K/UL (ref 0–0.1)
BASOPHILS NFR BLD: 1 % (ref 0–1)
BUN SERPL-MCNC: 15 MG/DL (ref 6–20)
BUN/CREAT SERPL: 12 (ref 12–20)
CALCIUM SERPL-MCNC: 8.3 MG/DL (ref 8.5–10.1)
CHLORIDE SERPL-SCNC: 109 MMOL/L (ref 97–108)
CO2 SERPL-SCNC: 29 MMOL/L (ref 21–32)
CREAT SERPL-MCNC: 1.26 MG/DL (ref 0.7–1.3)
DIFFERENTIAL METHOD BLD: ABNORMAL
EOSINOPHIL # BLD: 0.2 K/UL (ref 0–0.4)
EOSINOPHIL NFR BLD: 5 % (ref 0–7)
ERYTHROCYTE [DISTWIDTH] IN BLOOD BY AUTOMATED COUNT: 13.2 % (ref 11.5–14.5)
GLUCOSE SERPL-MCNC: 120 MG/DL (ref 65–100)
HCT VFR BLD AUTO: 37.1 % (ref 36.6–50.3)
HGB BLD-MCNC: 12 G/DL (ref 12.1–17)
IMM GRANULOCYTES # BLD AUTO: 0 K/UL (ref 0–0.04)
IMM GRANULOCYTES NFR BLD AUTO: 0 % (ref 0–0.5)
INR PPP: 2.9 (ref 0.9–1.1)
LYMPHOCYTES # BLD: 1 K/UL (ref 0.8–3.5)
LYMPHOCYTES NFR BLD: 21 % (ref 12–49)
MCH RBC QN AUTO: 33.1 PG (ref 26–34)
MCHC RBC AUTO-ENTMCNC: 32.3 G/DL (ref 30–36.5)
MCV RBC AUTO: 102.5 FL (ref 80–99)
MONOCYTES # BLD: 0.5 K/UL (ref 0–1)
MONOCYTES NFR BLD: 11 % (ref 5–13)
NEUTS SEG # BLD: 3 K/UL (ref 1.8–8)
NEUTS SEG NFR BLD: 62 % (ref 32–75)
NRBC # BLD: 0 K/UL (ref 0–0.01)
NRBC BLD-RTO: 0 PER 100 WBC
PLATELET # BLD AUTO: 136 K/UL (ref 150–400)
PMV BLD AUTO: 10.1 FL (ref 8.9–12.9)
POTASSIUM SERPL-SCNC: 4.1 MMOL/L (ref 3.5–5.1)
PROTHROMBIN TIME: 28.8 SEC (ref 9–11.1)
RBC # BLD AUTO: 3.62 M/UL (ref 4.1–5.7)
SODIUM SERPL-SCNC: 139 MMOL/L (ref 136–145)
WBC # BLD AUTO: 4.8 K/UL (ref 4.1–11.1)

## 2023-04-15 PROCEDURE — 85610 PROTHROMBIN TIME: CPT

## 2023-04-15 PROCEDURE — 36415 COLL VENOUS BLD VENIPUNCTURE: CPT

## 2023-04-15 PROCEDURE — 74011250637 HC RX REV CODE- 250/637: Performed by: INTERNAL MEDICINE

## 2023-04-15 PROCEDURE — 85025 COMPLETE CBC W/AUTO DIFF WBC: CPT

## 2023-04-15 PROCEDURE — 80048 BASIC METABOLIC PNL TOTAL CA: CPT

## 2023-04-15 RX ADMIN — BUPROPION HYDROCHLORIDE 150 MG: 150 TABLET, EXTENDED RELEASE ORAL at 08:58

## 2023-04-15 RX ADMIN — ALPRAZOLAM 0.25 MG: 0.25 TABLET ORAL at 06:20

## 2023-04-16 LAB
ATRIAL RATE: 58 BPM
CALCULATED P AXIS, ECG09: 73 DEGREES
CALCULATED R AXIS, ECG10: 71 DEGREES
CALCULATED T AXIS, ECG11: 84 DEGREES
DIAGNOSIS, 93000: NORMAL
P-R INTERVAL, ECG05: 154 MS
Q-T INTERVAL, ECG07: 456 MS
QRS DURATION, ECG06: 100 MS
QTC CALCULATION (BEZET), ECG08: 447 MS
VENTRICULAR RATE, ECG03: 58 BPM

## 2023-04-17 ENCOUNTER — CLINICAL SUPPORT (OUTPATIENT)
Dept: FAMILY MEDICINE CLINIC | Age: 83
End: 2023-04-17
Payer: MEDICARE

## 2023-04-17 VITALS
WEIGHT: 147.2 LBS | BODY MASS INDEX: 20.53 KG/M2 | OXYGEN SATURATION: 96 % | HEART RATE: 58 BPM | SYSTOLIC BLOOD PRESSURE: 120 MMHG | DIASTOLIC BLOOD PRESSURE: 72 MMHG

## 2023-04-17 DIAGNOSIS — D69.9 ANTICOAGULANT DISORDER (HCC): Primary | ICD-10-CM

## 2023-04-17 LAB
INR BLD: 2.9
PT POC: 34.7 SECONDS
VALID INTERNAL CONTROL?: YES

## 2023-04-17 PROCEDURE — 93793 ANTICOAG MGMT PT WARFARIN: CPT | Performed by: NURSE PRACTITIONER

## 2023-04-17 PROCEDURE — 85610 PROTHROMBIN TIME: CPT | Performed by: NURSE PRACTITIONER

## 2023-04-17 NOTE — PROGRESS NOTES
Patient is nonadherent and drinks alcohol daily. Recently discharge from ED,  He is to take 3 mg every other day and return in one week.   Information relayed to patient and wife by Leif De Jesus, [facility] Lubna Loera

## 2023-04-18 ENCOUNTER — TELEPHONE (OUTPATIENT)
Dept: FAMILY MEDICINE CLINIC | Age: 83
End: 2023-04-18

## 2023-04-18 DIAGNOSIS — I69.320 CVA, OLD, APHASIA: Primary | ICD-10-CM

## 2023-04-18 NOTE — TELEPHONE ENCOUNTER
[facility] called in regards to needing new Outpatient speech therapy orders due to already seeing patient, but patient was hospitalized over the weekend so needs new orders     Fax# [phone]    Also requested the old plan of care form be returned

## 2023-04-20 DIAGNOSIS — F41.9 ANXIETY: ICD-10-CM

## 2023-04-20 RX ORDER — ALPRAZOLAM 0.25 MG/1
TABLET ORAL
Qty: 75 TABLET | Refills: 0 | Status: SHIPPED | OUTPATIENT
Start: 2023-04-20

## 2023-04-24 ENCOUNTER — CLINICAL SUPPORT (OUTPATIENT)
Dept: FAMILY MEDICINE CLINIC | Age: 83
End: 2023-04-24
Payer: MEDICARE

## 2023-04-24 ENCOUNTER — TELEPHONE (OUTPATIENT)
Dept: FAMILY MEDICINE CLINIC | Age: 83
End: 2023-04-24

## 2023-04-24 VITALS
WEIGHT: 146.6 LBS | OXYGEN SATURATION: 98 % | TEMPERATURE: 97.1 F | DIASTOLIC BLOOD PRESSURE: 62 MMHG | HEART RATE: 42 BPM | BODY MASS INDEX: 20.45 KG/M2 | SYSTOLIC BLOOD PRESSURE: 110 MMHG

## 2023-04-24 DIAGNOSIS — I63.9 ACUTE CVA (CEREBROVASCULAR ACCIDENT) (HCC): Primary | ICD-10-CM

## 2023-04-24 DIAGNOSIS — D69.9 ANTICOAGULANT DISORDER (HCC): Primary | ICD-10-CM

## 2023-04-24 DIAGNOSIS — I69.320 CVA, OLD, APHASIA: ICD-10-CM

## 2023-04-24 LAB
INR BLD: 2
PT POC: 24.1 SECONDS
VALID INTERNAL CONTROL?: YES

## 2023-04-24 PROCEDURE — 93793 ANTICOAG MGMT PT WARFARIN: CPT | Performed by: NURSE PRACTITIONER

## 2023-04-24 PROCEDURE — 85610 PROTHROMBIN TIME: CPT | Performed by: NURSE PRACTITIONER

## 2023-04-24 NOTE — TELEPHONE ENCOUNTER
For speech therapy we need to change the order to Outpatient and not HH, it needs to say;    \"OUTPATIENT [address]"

## 2023-05-01 ENCOUNTER — CLINICAL SUPPORT (OUTPATIENT)
Dept: FAMILY MEDICINE CLINIC | Age: 83
End: 2023-05-01
Payer: MEDICARE

## 2023-05-01 VITALS
SYSTOLIC BLOOD PRESSURE: 120 MMHG | HEART RATE: 61 BPM | OXYGEN SATURATION: 97 % | BODY MASS INDEX: 20.25 KG/M2 | TEMPERATURE: 97.1 F | WEIGHT: 145.2 LBS | DIASTOLIC BLOOD PRESSURE: 62 MMHG

## 2023-05-01 DIAGNOSIS — I63.9 ACUTE CVA (CEREBROVASCULAR ACCIDENT) (HCC): Primary | ICD-10-CM

## 2023-05-01 LAB
INR BLD: 1.3
PT POC: 15.8 SECONDS
VALID INTERNAL CONTROL?: YES

## 2023-05-01 PROCEDURE — 85610 PROTHROMBIN TIME: CPT | Performed by: NURSE PRACTITIONER

## 2023-05-01 PROCEDURE — 93793 ANTICOAG MGMT PT WARFARIN: CPT | Performed by: NURSE PRACTITIONER

## 2023-05-15 ENCOUNTER — NURSE ONLY (OUTPATIENT)
Facility: CLINIC | Age: 83
End: 2023-05-15
Payer: MEDICARE

## 2023-05-15 VITALS
WEIGHT: 143.2 LBS | OXYGEN SATURATION: 99 % | SYSTOLIC BLOOD PRESSURE: 120 MMHG | BODY MASS INDEX: 19.97 KG/M2 | DIASTOLIC BLOOD PRESSURE: 52 MMHG | TEMPERATURE: 96.9 F | HEART RATE: 62 BPM

## 2023-05-15 DIAGNOSIS — Z86.73 PERSONAL HISTORY OF TRANSIENT ISCHEMIC ATTACK (TIA), AND CEREBRAL INFARCTION WITHOUT RESIDUAL DEFICITS: Primary | ICD-10-CM

## 2023-05-15 LAB
POC INR: 4.2
PROTHROMBIN TIME, POC: 51

## 2023-05-15 PROCEDURE — 85610 PROTHROMBIN TIME: CPT | Performed by: NURSE PRACTITIONER

## 2023-05-22 ENCOUNTER — NURSE ONLY (OUTPATIENT)
Facility: CLINIC | Age: 83
End: 2023-05-22
Payer: MEDICARE

## 2023-05-22 VITALS
DIASTOLIC BLOOD PRESSURE: 64 MMHG | WEIGHT: 143 LBS | HEART RATE: 67 BPM | OXYGEN SATURATION: 97 % | BODY MASS INDEX: 19.94 KG/M2 | SYSTOLIC BLOOD PRESSURE: 118 MMHG

## 2023-05-22 DIAGNOSIS — Z86.73 PERSONAL HISTORY OF TRANSIENT ISCHEMIC ATTACK (TIA), AND CEREBRAL INFARCTION WITHOUT RESIDUAL DEFICITS: Primary | ICD-10-CM

## 2023-05-22 LAB
POC INR: 3.9
PROTHROMBIN TIME, POC: 46.6

## 2023-05-22 PROCEDURE — 85610 PROTHROMBIN TIME: CPT | Performed by: NURSE PRACTITIONER

## 2023-05-22 PROCEDURE — 93793 ANTICOAG MGMT PT WARFARIN: CPT | Performed by: NURSE PRACTITIONER

## 2023-05-27 DIAGNOSIS — F41.9 ANXIETY: Primary | ICD-10-CM

## 2023-05-30 ENCOUNTER — NURSE ONLY (OUTPATIENT)
Facility: CLINIC | Age: 83
End: 2023-05-30
Payer: MEDICARE

## 2023-05-30 VITALS
HEART RATE: 84 BPM | DIASTOLIC BLOOD PRESSURE: 82 MMHG | SYSTOLIC BLOOD PRESSURE: 132 MMHG | WEIGHT: 142 LBS | OXYGEN SATURATION: 96 % | TEMPERATURE: 97.6 F | BODY MASS INDEX: 19.8 KG/M2 | RESPIRATION RATE: 14 BRPM

## 2023-05-30 DIAGNOSIS — Z86.73 PERSONAL HISTORY OF TRANSIENT ISCHEMIC ATTACK (TIA), AND CEREBRAL INFARCTION WITHOUT RESIDUAL DEFICITS: Primary | ICD-10-CM

## 2023-05-30 LAB
POC INR: 1.6
PROTHROMBIN TIME, POC: 19.7

## 2023-05-30 PROCEDURE — 93793 ANTICOAG MGMT PT WARFARIN: CPT | Performed by: NURSE PRACTITIONER

## 2023-05-30 PROCEDURE — 85610 PROTHROMBIN TIME: CPT | Performed by: NURSE PRACTITIONER

## 2023-06-02 RX ORDER — ALPRAZOLAM 0.25 MG/1
0.25 TABLET ORAL 3 TIMES DAILY PRN
OUTPATIENT
Start: 2023-06-02

## 2023-06-02 RX ORDER — ALPRAZOLAM 0.25 MG/1
TABLET ORAL
Qty: 75 TABLET | Refills: 0 | Status: SHIPPED | OUTPATIENT
Start: 2023-06-02 | End: 2023-07-02

## 2023-06-05 ENCOUNTER — TELEPHONE (OUTPATIENT)
Facility: CLINIC | Age: 83
End: 2023-06-05

## 2023-06-05 DIAGNOSIS — F41.9 ANXIETY: Primary | ICD-10-CM

## 2023-06-05 RX ORDER — ALPRAZOLAM 0.5 MG/1
TABLET ORAL
Qty: 38 TABLET | Refills: 0 | Status: SHIPPED | OUTPATIENT
Start: 2023-06-05 | End: 2023-07-03

## 2023-06-05 NOTE — PROGRESS NOTES
Med last refilled at 0.25 mgs  04 20 2023 for a quantity of 75 tablets.   Since he is cutting them in half will order 37 tabs

## 2023-06-05 NOTE — TELEPHONE ENCOUNTER
----- Message from Adelia Douglas sent at 6/5/2023 10:48 AM EDT -----  Subject: Medication Problem    Medication: ALPRAZolam (XANAX) 0.25 MG tablet  Dosage: .25 mg  Ordering Provider: Kimberli Prajapati    Question/Problem: Patient's wife states that the pharmacy does not have   this dosage available, and the  is not making it at this time. Please write prescription for . 5 and cut pill in half.        Pharmacy: [facility] [id number] - [address], [address] -   P [phone] Shahnaz Espinosa [phone]    ---------------------------------------------------------------------------  --------------  Emmanuel Jacob INFO  [phone]; OK to leave message on voicemail  ---------------------------------------------------------------------------  --------------    SCRIPT ANSWERS  Relationship to Patient: Spouse/Partner  Representative Name: Lora Mckeon, wife  Is the representative on the Communication Release of Information (CLOTILDE)   form in Epic: Yes

## 2023-06-06 ENCOUNTER — TELEPHONE (OUTPATIENT)
Facility: CLINIC | Age: 83
End: 2023-06-06

## 2023-06-19 ENCOUNTER — NURSE ONLY (OUTPATIENT)
Facility: CLINIC | Age: 83
End: 2023-06-19
Payer: MEDICARE

## 2023-06-19 VITALS
SYSTOLIC BLOOD PRESSURE: 140 MMHG | OXYGEN SATURATION: 97 % | BODY MASS INDEX: 19.53 KG/M2 | DIASTOLIC BLOOD PRESSURE: 98 MMHG | HEART RATE: 84 BPM | WEIGHT: 140 LBS

## 2023-06-19 DIAGNOSIS — Z86.73 PERSONAL HISTORY OF TRANSIENT ISCHEMIC ATTACK (TIA), AND CEREBRAL INFARCTION WITHOUT RESIDUAL DEFICITS: Primary | ICD-10-CM

## 2023-06-19 LAB
POC INR: 2.4
PROTHROMBIN TIME, POC: 29.2

## 2023-06-19 PROCEDURE — 85610 PROTHROMBIN TIME: CPT | Performed by: NURSE PRACTITIONER

## 2023-06-19 PROCEDURE — 93793 ANTICOAG MGMT PT WARFARIN: CPT | Performed by: NURSE PRACTITIONER

## 2023-07-10 ENCOUNTER — NURSE ONLY (OUTPATIENT)
Facility: CLINIC | Age: 83
End: 2023-07-10
Payer: MEDICARE

## 2023-07-10 VITALS
RESPIRATION RATE: 14 BRPM | WEIGHT: 139 LBS | OXYGEN SATURATION: 96 % | SYSTOLIC BLOOD PRESSURE: 132 MMHG | HEART RATE: 84 BPM | DIASTOLIC BLOOD PRESSURE: 64 MMHG | BODY MASS INDEX: 19.39 KG/M2

## 2023-07-10 DIAGNOSIS — Z86.73 PERSONAL HISTORY OF TRANSIENT ISCHEMIC ATTACK (TIA), AND CEREBRAL INFARCTION WITHOUT RESIDUAL DEFICITS: Primary | ICD-10-CM

## 2023-07-10 LAB
POC INR: >8
PROTHROMBIN TIME, POC: >96

## 2023-07-10 PROCEDURE — 85610 PROTHROMBIN TIME: CPT | Performed by: NURSE PRACTITIONER

## 2023-07-10 PROCEDURE — 93793 ANTICOAG MGMT PT WARFARIN: CPT | Performed by: NURSE PRACTITIONER

## 2023-07-10 NOTE — PROGRESS NOTES
Patient had INR of >8 in the office today. Consulted with Provider Warren Douglas who advised that patient goes to the Hospital immediately. I spoke with Patient and wife and advised of what Karina Burns said about going to the hospital. Patient stated he did not want to go to hospital. I explained with SHELLEY Leiva, that all it takes is a bump and he could be bleeding profusely patient stated \"he would take 50 of those before he goes there\" patient then went on to state he did not want to eat at hospital. Patient at the end stated \"forget it\" and walked away with his wife. Patient also stated that he had a fa. ll when asked when his fall was patient could not tell me but had a scab on his head where he fell.

## 2023-07-14 ENCOUNTER — NURSE ONLY (OUTPATIENT)
Facility: CLINIC | Age: 83
End: 2023-07-14
Payer: MEDICARE

## 2023-07-14 VITALS
HEIGHT: 71 IN | OXYGEN SATURATION: 100 % | RESPIRATION RATE: 16 BRPM | DIASTOLIC BLOOD PRESSURE: 64 MMHG | WEIGHT: 137 LBS | HEART RATE: 71 BPM | SYSTOLIC BLOOD PRESSURE: 120 MMHG | BODY MASS INDEX: 19.18 KG/M2

## 2023-07-14 DIAGNOSIS — Z86.73 PERSONAL HISTORY OF TRANSIENT ISCHEMIC ATTACK (TIA), AND CEREBRAL INFARCTION WITHOUT RESIDUAL DEFICITS: Primary | ICD-10-CM

## 2023-07-14 DIAGNOSIS — F41.9 ANXIETY: Primary | ICD-10-CM

## 2023-07-14 LAB
POC INR: 3.3
PROTHROMBIN TIME, POC: 34.2

## 2023-07-14 PROCEDURE — 93793 ANTICOAG MGMT PT WARFARIN: CPT | Performed by: NURSE PRACTITIONER

## 2023-07-14 PROCEDURE — 85610 PROTHROMBIN TIME: CPT | Performed by: NURSE PRACTITIONER

## 2023-07-16 RX ORDER — ATORVASTATIN CALCIUM 40 MG/1
TABLET, FILM COATED ORAL
Qty: 90 TABLET | Refills: 1 | Status: SHIPPED | OUTPATIENT
Start: 2023-07-16

## 2023-07-17 RX ORDER — ALPRAZOLAM 0.25 MG/1
TABLET ORAL
Qty: 75 TABLET | Refills: 0 | Status: SHIPPED | OUTPATIENT
Start: 2023-07-17 | End: 2023-08-16

## 2023-07-19 ENCOUNTER — NURSE ONLY (OUTPATIENT)
Facility: CLINIC | Age: 83
End: 2023-07-19
Payer: MEDICARE

## 2023-07-19 VITALS
WEIGHT: 139 LBS | BODY MASS INDEX: 19.46 KG/M2 | TEMPERATURE: 97.7 F | DIASTOLIC BLOOD PRESSURE: 65 MMHG | HEIGHT: 71 IN | SYSTOLIC BLOOD PRESSURE: 111 MMHG

## 2023-07-19 DIAGNOSIS — Z86.73 PERSONAL HISTORY OF TRANSIENT ISCHEMIC ATTACK (TIA), AND CEREBRAL INFARCTION WITHOUT RESIDUAL DEFICITS: Primary | ICD-10-CM

## 2023-07-19 LAB
POC INR: 1.8
PROTHROMBIN TIME, POC: 22

## 2023-07-19 PROCEDURE — 85610 PROTHROMBIN TIME: CPT | Performed by: NURSE PRACTITIONER

## 2023-07-19 PROCEDURE — 93793 ANTICOAG MGMT PT WARFARIN: CPT | Performed by: NURSE PRACTITIONER

## 2023-07-27 ENCOUNTER — NURSE ONLY (OUTPATIENT)
Facility: CLINIC | Age: 83
End: 2023-07-27
Payer: MEDICARE

## 2023-07-27 ENCOUNTER — TELEPHONE (OUTPATIENT)
Facility: CLINIC | Age: 83
End: 2023-07-27

## 2023-07-27 VITALS
HEIGHT: 71 IN | OXYGEN SATURATION: 98 % | HEART RATE: 61 BPM | SYSTOLIC BLOOD PRESSURE: 107 MMHG | BODY MASS INDEX: 18.93 KG/M2 | DIASTOLIC BLOOD PRESSURE: 57 MMHG | TEMPERATURE: 97.1 F | WEIGHT: 135.2 LBS

## 2023-07-27 DIAGNOSIS — Z86.73 PERSONAL HISTORY OF TRANSIENT ISCHEMIC ATTACK (TIA), AND CEREBRAL INFARCTION WITHOUT RESIDUAL DEFICITS: Primary | ICD-10-CM

## 2023-07-27 LAB
POC INR: 2.2
PROTHROMBIN TIME, POC: 26

## 2023-07-27 PROCEDURE — 85610 PROTHROMBIN TIME: CPT | Performed by: NURSE PRACTITIONER

## 2023-07-27 PROCEDURE — 93793 ANTICOAG MGMT PT WARFARIN: CPT | Performed by: NURSE PRACTITIONER

## 2023-07-27 NOTE — TELEPHONE ENCOUNTER
Pt came in today for his pt/inr and had concerns with his health and asked that I send a message to his PCP to see if he needed an appt with them or just go straight to a specialist. Pt reported having headaches and being dizzy for several months now along with having no appetite. Pt reported that \"he can't eat\" and \"nothing tastes good to him anymore. \" Please advise

## 2023-07-31 RX ORDER — OXYBUTYNIN CHLORIDE 10 MG/1
TABLET, EXTENDED RELEASE ORAL
Qty: 90 TABLET | Refills: 1 | Status: SHIPPED | OUTPATIENT
Start: 2023-07-31

## 2023-08-01 RX ORDER — WARFARIN SODIUM 3 MG/1
TABLET ORAL
Qty: 60 TABLET | Refills: 1 | Status: SHIPPED | OUTPATIENT
Start: 2023-08-01 | End: 2023-08-11 | Stop reason: ALTCHOICE

## 2023-08-11 RX ORDER — CLOPIDOGREL BISULFATE 75 MG/1
75 TABLET ORAL DAILY
COMMUNITY

## 2023-08-15 ENCOUNTER — TELEPHONE (OUTPATIENT)
Facility: CLINIC | Age: 83
End: 2023-08-15

## 2023-08-15 NOTE — TELEPHONE ENCOUNTER
Home Health called to let pt provider team now that pt had a fall about four days ago. Stated there was no injury and pt got himself up after the fall.

## 2023-08-22 DIAGNOSIS — F41.9 ANXIETY: Primary | ICD-10-CM

## 2023-08-26 RX ORDER — ALPRAZOLAM 0.25 MG/1
TABLET ORAL
Qty: 75 TABLET | Refills: 0 | Status: SHIPPED | OUTPATIENT
Start: 2023-08-26 | End: 2023-09-23

## 2023-09-08 ENCOUNTER — HOSPITAL ENCOUNTER (INPATIENT)
Facility: HOSPITAL | Age: 83
LOS: 3 days | Discharge: HOME | End: 2023-09-11
Attending: EMERGENCY MEDICINE | Admitting: STUDENT IN AN ORGANIZED HEALTH CARE EDUCATION/TRAINING PROGRAM
Payer: MEDICARE

## 2023-09-08 ENCOUNTER — APPOINTMENT (OUTPATIENT)
Facility: HOSPITAL | Age: 83
End: 2023-09-08
Payer: MEDICARE

## 2023-09-08 ENCOUNTER — APPOINTMENT (OUTPATIENT)
Facility: HOSPITAL | Age: 83
End: 2023-09-08
Attending: STUDENT IN AN ORGANIZED HEALTH CARE EDUCATION/TRAINING PROGRAM
Payer: MEDICARE

## 2023-09-08 DIAGNOSIS — R47.1 DYSARTHRIA: ICD-10-CM

## 2023-09-08 DIAGNOSIS — R29.90 STROKE-LIKE SYMPTOM: Primary | ICD-10-CM

## 2023-09-08 DIAGNOSIS — R53.1 GENERALIZED WEAKNESS: ICD-10-CM

## 2023-09-08 DIAGNOSIS — F41.9 ANXIETY: ICD-10-CM

## 2023-09-08 DIAGNOSIS — I63.9 ACUTE CVA (CEREBROVASCULAR ACCIDENT) (HCC): ICD-10-CM

## 2023-09-08 PROBLEM — R41.82 ALTERED MENTAL STATE: Status: ACTIVE | Noted: 2023-09-08

## 2023-09-08 LAB
ALBUMIN SERPL-MCNC: 3.9 G/DL (ref 3.5–5)
ALBUMIN/GLOB SERPL: 1.2 (ref 1.1–2.2)
ALP SERPL-CCNC: 133 U/L (ref 45–117)
ALT SERPL-CCNC: 24 U/L (ref 12–78)
ANION GAP SERPL CALC-SCNC: 2 MMOL/L (ref 5–15)
AST SERPL-CCNC: 29 U/L (ref 15–37)
BASOPHILS # BLD: 0 K/UL (ref 0–0.1)
BASOPHILS NFR BLD: 0 % (ref 0–1)
BILIRUB SERPL-MCNC: 0.6 MG/DL (ref 0.2–1)
BUN SERPL-MCNC: 17 MG/DL (ref 6–20)
BUN/CREAT SERPL: 10 (ref 12–20)
CALCIUM SERPL-MCNC: 9.5 MG/DL (ref 8.5–10.1)
CHLORIDE SERPL-SCNC: 108 MMOL/L (ref 97–108)
CO2 SERPL-SCNC: 30 MMOL/L (ref 21–32)
CREAT SERPL-MCNC: 1.72 MG/DL (ref 0.7–1.3)
DIFFERENTIAL METHOD BLD: ABNORMAL
EKG ATRIAL RATE: 71 BPM
EKG DIAGNOSIS: NORMAL
EKG P AXIS: 81 DEGREES
EKG P-R INTERVAL: 158 MS
EKG Q-T INTERVAL: 410 MS
EKG QRS DURATION: 124 MS
EKG QTC CALCULATION (BAZETT): 445 MS
EKG R AXIS: 71 DEGREES
EKG T AXIS: 239 DEGREES
EKG VENTRICULAR RATE: 71 BPM
EOSINOPHIL # BLD: 0.1 K/UL (ref 0–0.4)
EOSINOPHIL NFR BLD: 1 % (ref 0–7)
ERYTHROCYTE [DISTWIDTH] IN BLOOD BY AUTOMATED COUNT: 14.6 % (ref 11.5–14.5)
ETHANOL SERPL-MCNC: <10 MG/DL (ref 0–0.08)
GLOBULIN SER CALC-MCNC: 3.3 G/DL (ref 2–4)
GLUCOSE BLD STRIP.AUTO-MCNC: 96 MG/DL (ref 65–117)
GLUCOSE SERPL-MCNC: 99 MG/DL (ref 65–100)
HCT VFR BLD AUTO: 38.6 % (ref 36.6–50.3)
HGB BLD-MCNC: 12.4 G/DL (ref 12.1–17)
IMM GRANULOCYTES # BLD AUTO: 0.1 K/UL (ref 0–0.04)
IMM GRANULOCYTES NFR BLD AUTO: 1 % (ref 0–0.5)
INR PPP: 1 (ref 0.9–1.1)
LYMPHOCYTES # BLD: 0.6 K/UL (ref 0.8–3.5)
LYMPHOCYTES NFR BLD: 9 % (ref 12–49)
MCH RBC QN AUTO: 34 PG (ref 26–34)
MCHC RBC AUTO-ENTMCNC: 32.1 G/DL (ref 30–36.5)
MCV RBC AUTO: 105.8 FL (ref 80–99)
MONOCYTES # BLD: 0.1 K/UL (ref 0–1)
MONOCYTES NFR BLD: 2 % (ref 5–13)
NEUTS SEG # BLD: 5.5 K/UL (ref 1.8–8)
NEUTS SEG NFR BLD: 87 % (ref 32–75)
NRBC # BLD: 0 K/UL (ref 0–0.01)
NRBC BLD-RTO: 0 PER 100 WBC
PLATELET # BLD AUTO: 175 K/UL (ref 150–400)
PMV BLD AUTO: 10.8 FL (ref 8.9–12.9)
POTASSIUM SERPL-SCNC: 4.3 MMOL/L (ref 3.5–5.1)
PROT SERPL-MCNC: 7.2 G/DL (ref 6.4–8.2)
PROTHROMBIN TIME: 10.3 SEC (ref 9–11.1)
RBC # BLD AUTO: 3.65 M/UL (ref 4.1–5.7)
RBC MORPH BLD: ABNORMAL
SERVICE CMNT-IMP: NORMAL
SODIUM SERPL-SCNC: 140 MMOL/L (ref 136–145)
TROPONIN I SERPL HS-MCNC: 26 NG/L (ref 0–76)
TSH SERPL DL<=0.05 MIU/L-ACNC: 0.56 UIU/ML (ref 0.36–3.74)
WBC # BLD AUTO: 6.4 K/UL (ref 4.1–11.1)

## 2023-09-08 PROCEDURE — 94761 N-INVAS EAR/PLS OXIMETRY MLT: CPT

## 2023-09-08 PROCEDURE — 4A03X5D MEASUREMENT OF ARTERIAL FLOW, INTRACRANIAL, EXTERNAL APPROACH: ICD-10-PCS | Performed by: RADIOLOGY

## 2023-09-08 PROCEDURE — 70450 CT HEAD/BRAIN W/O DYE: CPT

## 2023-09-08 PROCEDURE — 82077 ASSAY SPEC XCP UR&BREATH IA: CPT

## 2023-09-08 PROCEDURE — 0042T CT BRAIN PERFUSION: CPT

## 2023-09-08 PROCEDURE — 6360000002 HC RX W HCPCS: Performed by: STUDENT IN AN ORGANIZED HEALTH CARE EDUCATION/TRAINING PROGRAM

## 2023-09-08 PROCEDURE — 82962 GLUCOSE BLOOD TEST: CPT

## 2023-09-08 PROCEDURE — 93306 TTE W/DOPPLER COMPLETE: CPT

## 2023-09-08 PROCEDURE — 93005 ELECTROCARDIOGRAM TRACING: CPT | Performed by: EMERGENCY MEDICINE

## 2023-09-08 PROCEDURE — 99285 EMERGENCY DEPT VISIT HI MDM: CPT

## 2023-09-08 PROCEDURE — 2580000003 HC RX 258: Performed by: EMERGENCY MEDICINE

## 2023-09-08 PROCEDURE — 2580000003 HC RX 258: Performed by: STUDENT IN AN ORGANIZED HEALTH CARE EDUCATION/TRAINING PROGRAM

## 2023-09-08 PROCEDURE — 84443 ASSAY THYROID STIM HORMONE: CPT

## 2023-09-08 PROCEDURE — 1100000003 HC PRIVATE W/ TELEMETRY

## 2023-09-08 PROCEDURE — 82607 VITAMIN B-12: CPT

## 2023-09-08 PROCEDURE — 96360 HYDRATION IV INFUSION INIT: CPT

## 2023-09-08 PROCEDURE — 93010 ELECTROCARDIOGRAM REPORT: CPT | Performed by: INTERNAL MEDICINE

## 2023-09-08 PROCEDURE — 71045 X-RAY EXAM CHEST 1 VIEW: CPT

## 2023-09-08 PROCEDURE — 86592 SYPHILIS TEST NON-TREP QUAL: CPT

## 2023-09-08 PROCEDURE — 84484 ASSAY OF TROPONIN QUANT: CPT

## 2023-09-08 PROCEDURE — 96361 HYDRATE IV INFUSION ADD-ON: CPT

## 2023-09-08 PROCEDURE — 82746 ASSAY OF FOLIC ACID SERUM: CPT

## 2023-09-08 PROCEDURE — 95819 EEG AWAKE AND ASLEEP: CPT

## 2023-09-08 PROCEDURE — 36415 COLL VENOUS BLD VENIPUNCTURE: CPT

## 2023-09-08 PROCEDURE — 6370000000 HC RX 637 (ALT 250 FOR IP): Performed by: STUDENT IN AN ORGANIZED HEALTH CARE EDUCATION/TRAINING PROGRAM

## 2023-09-08 PROCEDURE — 70496 CT ANGIOGRAPHY HEAD: CPT

## 2023-09-08 PROCEDURE — 85025 COMPLETE CBC W/AUTO DIFF WBC: CPT

## 2023-09-08 PROCEDURE — 85610 PROTHROMBIN TIME: CPT

## 2023-09-08 PROCEDURE — 6360000004 HC RX CONTRAST MEDICATION: Performed by: STUDENT IN AN ORGANIZED HEALTH CARE EDUCATION/TRAINING PROGRAM

## 2023-09-08 PROCEDURE — 80053 COMPREHEN METABOLIC PANEL: CPT

## 2023-09-08 RX ORDER — SODIUM CHLORIDE 0.9 % (FLUSH) 0.9 %
5-40 SYRINGE (ML) INJECTION EVERY 12 HOURS SCHEDULED
Status: DISCONTINUED | OUTPATIENT
Start: 2023-09-08 | End: 2023-09-11 | Stop reason: HOSPADM

## 2023-09-08 RX ORDER — OXYBUTYNIN CHLORIDE 5 MG/1
10 TABLET, EXTENDED RELEASE ORAL DAILY
Status: DISCONTINUED | OUTPATIENT
Start: 2023-09-08 | End: 2023-09-11 | Stop reason: HOSPADM

## 2023-09-08 RX ORDER — ONDANSETRON 2 MG/ML
4 INJECTION INTRAMUSCULAR; INTRAVENOUS EVERY 6 HOURS PRN
Status: DISCONTINUED | OUTPATIENT
Start: 2023-09-08 | End: 2023-09-11 | Stop reason: HOSPADM

## 2023-09-08 RX ORDER — ATORVASTATIN CALCIUM 20 MG/1
80 TABLET, FILM COATED ORAL NIGHTLY
Status: DISCONTINUED | OUTPATIENT
Start: 2023-09-08 | End: 2023-09-09

## 2023-09-08 RX ORDER — SODIUM CHLORIDE 9 MG/ML
INJECTION, SOLUTION INTRAVENOUS PRN
Status: DISCONTINUED | OUTPATIENT
Start: 2023-09-08 | End: 2023-09-11 | Stop reason: HOSPADM

## 2023-09-08 RX ORDER — CLOPIDOGREL BISULFATE 75 MG/1
75 TABLET ORAL DAILY
Status: DISCONTINUED | OUTPATIENT
Start: 2023-09-08 | End: 2023-09-11 | Stop reason: HOSPADM

## 2023-09-08 RX ORDER — ONDANSETRON 4 MG/1
4 TABLET, ORALLY DISINTEGRATING ORAL EVERY 8 HOURS PRN
Status: DISCONTINUED | OUTPATIENT
Start: 2023-09-08 | End: 2023-09-11 | Stop reason: HOSPADM

## 2023-09-08 RX ORDER — LORAZEPAM 2 MG/ML
1 INJECTION INTRAMUSCULAR
Status: DISCONTINUED | OUTPATIENT
Start: 2023-09-08 | End: 2023-09-11 | Stop reason: HOSPADM

## 2023-09-08 RX ORDER — ASPIRIN 81 MG/1
81 TABLET ORAL DAILY
Status: DISCONTINUED | OUTPATIENT
Start: 2023-09-08 | End: 2023-09-11 | Stop reason: HOSPADM

## 2023-09-08 RX ORDER — LORAZEPAM 1 MG/1
3 TABLET ORAL
Status: DISCONTINUED | OUTPATIENT
Start: 2023-09-08 | End: 2023-09-11 | Stop reason: HOSPADM

## 2023-09-08 RX ORDER — NICOTINE 21 MG/24HR
1 PATCH, TRANSDERMAL 24 HOURS TRANSDERMAL DAILY
Status: DISCONTINUED | OUTPATIENT
Start: 2023-09-08 | End: 2023-09-11 | Stop reason: HOSPADM

## 2023-09-08 RX ORDER — LABETALOL HYDROCHLORIDE 5 MG/ML
10 INJECTION, SOLUTION INTRAVENOUS EVERY 10 MIN PRN
Status: DISCONTINUED | OUTPATIENT
Start: 2023-09-08 | End: 2023-09-11 | Stop reason: HOSPADM

## 2023-09-08 RX ORDER — LORAZEPAM 1 MG/1
4 TABLET ORAL
Status: DISCONTINUED | OUTPATIENT
Start: 2023-09-08 | End: 2023-09-11 | Stop reason: HOSPADM

## 2023-09-08 RX ORDER — LORAZEPAM 2 MG/ML
2 INJECTION INTRAMUSCULAR
Status: DISCONTINUED | OUTPATIENT
Start: 2023-09-08 | End: 2023-09-11 | Stop reason: HOSPADM

## 2023-09-08 RX ORDER — LORAZEPAM 1 MG/1
2 TABLET ORAL
Status: DISCONTINUED | OUTPATIENT
Start: 2023-09-08 | End: 2023-09-11 | Stop reason: HOSPADM

## 2023-09-08 RX ORDER — LORAZEPAM 1 MG/1
1 TABLET ORAL
Status: DISCONTINUED | OUTPATIENT
Start: 2023-09-08 | End: 2023-09-11 | Stop reason: HOSPADM

## 2023-09-08 RX ORDER — POLYETHYLENE GLYCOL 3350 17 G/17G
17 POWDER, FOR SOLUTION ORAL DAILY PRN
Status: DISCONTINUED | OUTPATIENT
Start: 2023-09-08 | End: 2023-09-11 | Stop reason: HOSPADM

## 2023-09-08 RX ORDER — 0.9 % SODIUM CHLORIDE 0.9 %
1000 INTRAVENOUS SOLUTION INTRAVENOUS ONCE
Status: COMPLETED | OUTPATIENT
Start: 2023-09-08 | End: 2023-09-08

## 2023-09-08 RX ORDER — LORAZEPAM 2 MG/ML
3 INJECTION INTRAMUSCULAR
Status: DISCONTINUED | OUTPATIENT
Start: 2023-09-08 | End: 2023-09-11 | Stop reason: HOSPADM

## 2023-09-08 RX ORDER — SODIUM CHLORIDE 0.9 % (FLUSH) 0.9 %
5-40 SYRINGE (ML) INJECTION PRN
Status: DISCONTINUED | OUTPATIENT
Start: 2023-09-08 | End: 2023-09-11 | Stop reason: HOSPADM

## 2023-09-08 RX ORDER — ASPIRIN 325 MG
325 TABLET ORAL
Status: DISCONTINUED | OUTPATIENT
Start: 2023-09-08 | End: 2023-09-08

## 2023-09-08 RX ORDER — LORAZEPAM 2 MG/ML
4 INJECTION INTRAMUSCULAR
Status: DISCONTINUED | OUTPATIENT
Start: 2023-09-08 | End: 2023-09-11 | Stop reason: HOSPADM

## 2023-09-08 RX ORDER — PHENOBARBITAL SODIUM 65 MG/ML
130 INJECTION INTRAMUSCULAR
Status: DISCONTINUED | OUTPATIENT
Start: 2023-09-08 | End: 2023-09-11 | Stop reason: HOSPADM

## 2023-09-08 RX ORDER — PHENOBARBITAL SODIUM 65 MG/ML
65 INJECTION INTRAMUSCULAR 2 TIMES DAILY
Status: DISCONTINUED | OUTPATIENT
Start: 2023-09-09 | End: 2023-09-08

## 2023-09-08 RX ORDER — METOPROLOL SUCCINATE 25 MG/1
12.5 TABLET, EXTENDED RELEASE ORAL DAILY
Status: DISCONTINUED | OUTPATIENT
Start: 2023-09-08 | End: 2023-09-11 | Stop reason: HOSPADM

## 2023-09-08 RX ORDER — THIAMINE HYDROCHLORIDE 100 MG/ML
100 INJECTION, SOLUTION INTRAMUSCULAR; INTRAVENOUS DAILY
Status: DISCONTINUED | OUTPATIENT
Start: 2023-09-08 | End: 2023-09-08

## 2023-09-08 RX ORDER — GAUZE BANDAGE 2" X 2"
100 BANDAGE TOPICAL DAILY
Status: DISCONTINUED | OUTPATIENT
Start: 2023-09-08 | End: 2023-09-09

## 2023-09-08 RX ORDER — PHENOBARBITAL SODIUM 65 MG/ML
65 INJECTION INTRAMUSCULAR 2 TIMES DAILY
Status: DISCONTINUED | OUTPATIENT
Start: 2023-09-08 | End: 2023-09-09

## 2023-09-08 RX ADMIN — SODIUM CHLORIDE 1000 ML: 9 INJECTION, SOLUTION INTRAVENOUS at 12:34

## 2023-09-08 RX ADMIN — IOPAMIDOL 140 ML: 755 INJECTION, SOLUTION INTRAVENOUS at 12:13

## 2023-09-08 RX ADMIN — SODIUM CHLORIDE, PRESERVATIVE FREE 10 ML: 5 INJECTION INTRAVENOUS at 22:44

## 2023-09-08 RX ADMIN — ATORVASTATIN CALCIUM 80 MG: 20 TABLET, FILM COATED ORAL at 22:42

## 2023-09-08 RX ADMIN — PHENOBARBITAL SODIUM 65 MG: 65 INJECTION INTRAMUSCULAR at 22:57

## 2023-09-08 ASSESSMENT — ENCOUNTER SYMPTOMS
SHORTNESS OF BREATH: 0
ABDOMINAL PAIN: 0

## 2023-09-08 ASSESSMENT — PAIN - FUNCTIONAL ASSESSMENT
PAIN_FUNCTIONAL_ASSESSMENT: ADULT NONVERBAL PAIN SCALE (NPVS)
PAIN_FUNCTIONAL_ASSESSMENT: NONE - DENIES PAIN

## 2023-09-08 NOTE — ED NOTES
Pt returned from CT at this time; tele neurologist Dr Yuli Staples on screen to evaluate patient, pt unable to stay awake for exam, this RN trying to complete assessment with neurologist.      Fernando Leblanc RN  09/08/23 [id number]

## 2023-09-08 NOTE — PROGRESS NOTES
Attempted to complete swallowing evaluation. He currently will only rouse for a few seconds. 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 His swallowing was minimally impacted at that time. SLP will follow and eval when more alert.

## 2023-09-08 NOTE — ED NOTES
Stroke Education provided to relative(s) and the following topics were discussed    1. Patients personal risk factors for stroke are atrial fibrillation, hypertension, and smoking    2. Warning signs of Stroke:        * Sudden numbness or weakness of the face, arm or leg, especially on one side of          The body            * Sudden confusion, trouble speaking or understanding        * Sudden trouble seeing in one or both eyes        * Sudden trouble walking, dizziness, loss of balance or coordination        * Sudden severe headache with no known cause      3. Importance of activation Emergency Medical Services ( 9-1-1 ) immediately if experience any warning signs of stroke. 4. Be sure and schedule a follow-up appointment with your primary care doctor or any specialists as instructed. 5. You must take medicine every day to treat your risk factors for stroke. Be sure to take your medicines exactly as your doctor tells you: no more, no less. Know what your medicines are for , what they do. Anti-thrombotics /anticoagulants can help prevent strokes. You are taking the following medicine(s)  ASA, plavix, metoprolol    6. Smoking and second-hand smoke greatly increase your risk of stroke, cardiovascular disease and death. Smoking history packs, 1.5 per day    7. Information provided was Verbal Education    8. Documentation of teaching completed in Patient Education Activity and on Care Plan with teaching response noted?   yes       Gigi Lafleur RN  09/08/23 [id number]

## 2023-09-08 NOTE — ED NOTES
Echo tech came out to inform RN that pt was having long apneic episodes during Echo where it was very difficult to arouse patient during these episodes. Echo tech also placed pt on 2L NC due to O2 sats in 80s. Pt is now 95% on 2L NC O2. RN called provider to inform him. Provider aware at this time.      Parker Jeffers RN  09/08/23 [id number]

## 2023-09-08 NOTE — ED PROVIDER NOTES
[facility] EMERGENCY DEPT  EMERGENCY DEPARTMENT ENCOUNTER      Pt Name: Shahzad Melvin  MRN: [id number]  [address] [facility] [place] 1940  Date of evaluation: 9/8/2023  Provider: Khalif Cobian MD      HISTORY OF PRESENT ILLNESS      61-year-old male with prior history of CVA, alcoholism presenting to the ER for altered mental status. Patient was last known well at 8 PM.  This morning family saw him at 8 AM trying to visit his family member in the hospital.  He is speech was slurred, he was having difficulty walking and he had very poor comprehension. He was immediately referred to the emergency department by his concern family. Patient is slow to answer questions, speech is very garbled and difficult to understand. He is oriented to person, place, time. Nursing Notes were reviewed. REVIEW OF SYSTEMS         Review of Systems   Constitutional:  Positive for activity change and fatigue. Respiratory:  Negative for shortness of breath. Cardiovascular:  Negative for chest pain. Gastrointestinal:  Negative for abdominal pain. Neurological:  Positive for speech difficulty and weakness. Psychiatric/Behavioral:  Positive for confusion and decreased concentration.           PAST MEDICAL HISTORY     Past Medical History:   Diagnosis Date    Anemia 10/28/2020    Anxiety 10/28/2020    Basal cell carcinoma (BCC) of skin of face 7/28/2021    CAD (coronary artery disease) 1997    stents    Depression 2019    Ill-defined condition     High cholesterole    Insomnia 10/28/2020    Low hemoglobin     Peripheral arterial occlusive disease ([address]) 11/16/2020    Primary erectile dysfunction 10/28/2020    Vitamin D deficiency 10/28/2020         SURGICAL HISTORY       Past Surgical History:   Procedure Laterality Date    COLONOSCOPY  2021    COLONOSCOPY N/A 6/13/2018    [facility]      into vein    HERNIA REPAIR  04/2020 TempSrc: Oral     SpO2: 100% 97%    Weight: 54.4 kg (120 lb)     Height: 1.803 m (5' 11\")           Medical Decision Making  Patient did not have an obvious large vessel occlusion on his imaging here. He was notified as a stroke alert. Obviously not a tPA candidate since his last known well was greater than 4.5 hours. Will be admitted for MRI, encephalopathy work-up. Discussed case w/ on call stroke neurologist.  Recommend admission for MRI and encephalopathy work-up. Recommend aspirin in addition to Plavix. Amount and/or Complexity of Data Reviewed  Labs: ordered. Radiology: ordered. ECG/medicine tests: ordered and independent interpretation performed. Discussion of management or test interpretation with external provider(s): Discussed case with the hospitalist Dr. Nuria Branch who will admit patient    Risk  OTC drugs. Prescription drug management. Decision regarding hospitalization.             REASSESSMENT          CONSULTS:  None    PROCEDURES:     Procedures            (Please note that portions of this note were completed with a voice recognition program.  Efforts were made to edit the dictations but occasionally words are mis-transcribed.)    Valerie Handley MD (electronically signed)  Emergency Attending Physician              Valerie Handley MD  09/08/23 [id number]

## 2023-09-08 NOTE — PROGRESS NOTES
Medication History Review by Pharmacist:    Comments/Recommendations:   Kierra Ramires with son in the room  Daughter via son text message  Pharmacist called [facility]  Pharmacist reviewed PCP notes in [address] [place], [place] [address] [address]. The patient self discontinued medications because he wanted to stop taking the medications. The patient's cardiologist Dr. Laney Joiner stopped the patient's warfarin on 8/11/23 and start clopidogrel. The patient last filled amiodarone in Dec of 2022, no refill history for Phoslo, isosorbide mononitrate last filled 5/1/23 for 30 day, metoprolol succinate last filled in 2020    Prior to Admission Medications:   No current facility-administered medications on file prior to encounter.      Current Outpatient Medications on File Prior to Encounter   Medication Sig Dispense Refill    ALPRAZolam (XANAX) 0.25 MG tablet TAKE ONE TABLET BY MOUTH THREE TIMES A DAY AS NEEDED FOR ANXIETY 75 tablet 0    clopidogrel (PLAVIX) 75 MG tablet Take 1 tablet by mouth daily      oxybutynin (DITROPAN-XL) 10 MG extended release tablet TAKE ONE TABLET BY MOUTH DAILY 90 tablet 1    atorvastatin (LIPITOR) 40 MG tablet TAKE ONE TABLET BY MOUTH ONCE NIGHTLY 90 tablet 1    aspirin 81 MG EC tablet Take 1 tablet by mouth daily      glucosamine-chondroitin 500-400 MG CAPS Take 1 capsule by mouth daily      [DISCONTINUED] POLICOSANOL PO Take 10 mg by mouth      [DISCONTINUED] amiodarone (CORDARONE) 200 MG tablet 1 tablet      [DISCONTINUED] calcium acetate (PHOSLO) 667 MG CAPS capsule Take by mouth 3 times daily (with meals)      [DISCONTINUED] ferrous sulfate (IRON 325) 325 (65 Fe) MG tablet Take 1 tablet by mouth every morning (before breakfast)      [DISCONTINUED] isosorbide mononitrate (ISMO;MONOKET) 20 MG tablet Take 1 tablet by mouth 3 times daily      [DISCONTINUED] metoprolol succinate (TOPROL XL) 25 MG extended release tablet Take 0.5 tablets by mouth daily         Thank you,      Aundrea Sawant, PharmD, BCPS

## 2023-09-08 NOTE — H&P
Hospitalist Admission Note    NAME:  Alexis Magdaleno   :  1940   MRN:  [id number]     Date/Time:  2023 1:54 PM    Patient PCP: WILL Berumen NP  ________________________________________________________________________    Given the patient's current clinical presentation, I have a high level of concern for decompensation if discharged from the emergency department. Complex decision making was performed, which includes reviewing the patient's available past medical records, laboratory results, and x-ray films. My assessment of this patient's clinical condition and my plan of care is as follows.     Assessment / Plan:    82years OLD CM with past medical history of CVA, severe alcoholism who presented to the emergency room today when was found by his family with altered mental status and slurred speech    #Acute encephalopathy  #Altered mental status  DD: TIA, acute ischemic stroke, alcoholic encephalopathy  -Patient has past medical history of CVA with residual change in speech, patient was found by his family with slurred speech today  -Alcohol level less than 10  -CT head no acute intracranial abnormality  -CTAHead and neck, no evidence for acute large vessel arterial occlusion, on the change of 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  -Neurology has been consulted for further recommendations  Plan  -FU TSH, UDS, RPR, B12  -TTE  -Continue patient on home medication aspirin Plavix due to previous CVA  -Fall precautions, seizure precautions  -Speech evaluation  -Neurochecks every 4  -Follow-up with neurology for further recommendations and MRI  -PT OT      ##Severe alcoholism  ##Substance use disorder  -Patient's daughter and son present in the room reports that he drinks about 2 L of alcohol daily, they are unaware when was last drink, reporting possibly yesterday  -Presentation today patient's alcohol <10  -Patient's family is unaware of history of withdrawals, uncooperative  Psych: Patient is uncooperative                _______________________________________________________________________  Care Plan discussed with:    Comments   Patient x Discussed with patient in room. POC outlined and Questions answered    Family      RN x    Care Manager                    Consultant:  connor MEEHAN MD   _______________________________________________________________________  Recommended Disposition:   Home with Family x   HH/PT/OT/RN    SNF/LTC    RD    ________________________________________________________________________  TOTAL TIME:  39 Minutes  45 minutes of critical care has been spent with the patient other than procedures        Comments   >50% of visit spent in counseling and coordination of care x Chart reviewed  Discussion with patient and/or family and questions answered     ________________________________________________________________________  Signed: Prieto Baxter MD        Procedures: see electronic medical records for all procedures/Xrays/labs and details which were not copied into this note but were reviewed prior to creation of Plan.

## 2023-09-08 NOTE — PROGRESS NOTES
Spiritual Care Assessment/Progress Note  [facility]    Name: Héctor Lehman MRN: [id number]    Age: 82 y.o. Sex: male   Language: English     Date: 9/8/2023            Total Time Calculated: 18 min              Spiritual Assessment begun in [facility] EMERGENCY DEPT  Service Provided For[de-identified] Patient, Family  Referral/Consult From[de-identified] Other (comment) (Medical Alert emergency for patient.)  Encounter Overview/Reason : Initial Encounter    Spiritual beliefs:      [x] Involved in a carter tradition/spiritual practice: [employer] [place]     [] Supported by a carter community:      [] Claims no spiritual orientation:      [] Seeking spiritual identity:           [] Adheres to an individual form of spirituality:      [] Not able to assess:                Identified resources for coping and support system:   Support System: Children       [x] Prayer                  [] Devotional reading               [] Music                  [] Guided Imagery     [] Pet visits                                        [] Other: (COMMENT)     Specific area/focus of visit   Encounter:    Crisis: Type: Code Stroke  Spiritual/Emotional needs:    Ritual, Rites and Sacraments:    Grief, Loss, and Adjustments: Type: New Diagnosis  Ethics/Mediation:    Behavioral Health:    Palliative Care: Advance Care Planning:      Plan/Referrals: Other (Comment) (Contact  if further assistance is needed.)    Narrative:  received medical alert of medical emergency. The patient has a spouse who is in room 330. The patient appears disoriented, not alert, not conversational. The patient's son and daughter were at bedside. The children of the patient communicated how their step mom was in room 330. The children also communicated how the patient is Loralpha Decent and has not had these symptoms before. The patient has stroke like symptoms and medical is conducting tests to figure out what is wrong with the patient.   met and conversed with the children of the patient and provided prayers of wellness and healing over the patient.      [facility]  Page Spiritual Care to [phone]

## 2023-09-08 NOTE — ED TRIAGE NOTES
Pt to hospital by POV to visit family admitted. Pt was upstairs when daughter also arrived noticed pt is more aphasic than baseline from previous stroke November 2022. Pt a&o with slurred speech. Daughter states pt is a heavy drinker of alcohol. Pt unable to follow directions moving bilateral lower extremities. Last known well around 8pm last night. Signs and symptoms: confusion, slurred speech.     Code Stroke activation time: 6929  Provider at bedside time:  1138  VAN score: Positive  Last Known Well (Time): 8pm 9/7/2023  Blood Glucose Result/Time: 96 @ 1141   Blood Pressure: 127/77  Anticoagulants (List medications): plavix

## 2023-09-09 ENCOUNTER — APPOINTMENT (OUTPATIENT)
Facility: HOSPITAL | Age: 83
End: 2023-09-09
Payer: MEDICARE

## 2023-09-09 PROBLEM — R47.01 APHASIA: Status: ACTIVE | Noted: 2023-09-09

## 2023-09-09 PROBLEM — R29.90 STROKE-LIKE SYMPTOM: Status: ACTIVE | Noted: 2023-09-09

## 2023-09-09 LAB
AMPHET UR QL SCN: POSITIVE
ASPIRIN: 395 ARU
BARBITURATES UR QL SCN: NEGATIVE
BENZODIAZ UR QL: NEGATIVE
CANNABINOIDS UR QL SCN: NEGATIVE
CHOLEST SERPL-MCNC: 90 MG/DL
COCAINE UR QL SCN: NEGATIVE
CREAT UR-MCNC: 70 MG/DL
ECHO AV MEAN GRADIENT: 2 MMHG
ECHO AV MEAN VELOCITY: 0.7 M/S
ECHO AV PEAK GRADIENT: 5 MMHG
ECHO AV PEAK VELOCITY: 1.1 M/S
ECHO AV VTI: 21.6 CM
ECHO BSA: 1.65 M2
ECHO LA VOL 4C: 47 ML (ref 18–58)
ECHO LA VOL 4C: 51 ML (ref 18–58)
ECHO LV EDV A4C: 201 ML
ECHO LV EDV INDEX A4C: 118 ML/M2
ECHO LV EJECTION FRACTION A4C: 17 %
ECHO LV ESV A4C: 167 ML
ECHO LV ESV INDEX A4C: 98 ML/M2
ECHO LV INTERNAL DIMENSION DIASTOLE INDEX: 3.82 CM/M2
ECHO LV INTERNAL DIMENSION DIASTOLIC: 6.5 CM (ref 4.2–5.9)
ECHO LV IVSD: 1.1 CM (ref 0.6–1)
ECHO LV MASS 2D: 301.3 G (ref 88–224)
ECHO LV MASS INDEX 2D: 177.2 G/M2 (ref 49–115)
ECHO LV POSTERIOR WALL DIASTOLIC: 1 CM (ref 0.6–1)
ECHO LV RELATIVE WALL THICKNESS RATIO: 0.31
ECHO LVOT AREA: 4.2 CM2
ECHO LVOT DIAM: 2.3 CM
ECHO MV MAX VELOCITY: 1 M/S
ECHO MV MEAN GRADIENT: 2 MMHG
ECHO MV MEAN VELOCITY: 0.6 M/S
ECHO MV PEAK GRADIENT: 4 MMHG
ECHO MV VTI: 21.4 CM
ECHO RV INTERNAL DIMENSION: 3.2 CM
ECHO RV TAPSE: 2.6 CM (ref 1.7–?)
ERYTHROCYTE [DISTWIDTH] IN BLOOD BY AUTOMATED COUNT: 14.5 % (ref 11.5–14.5)
EST. AVERAGE GLUCOSE BLD GHB EST-MCNC: 94 MG/DL
FOLATE SERPL-MCNC: 19.8 NG/ML (ref 5–21)
HBA1C MFR BLD: 4.9 % (ref 4–5.6)
HCT VFR BLD AUTO: 35.5 % (ref 36.6–50.3)
HDLC SERPL-MCNC: 59 MG/DL
HDLC SERPL: 1.5 (ref 0–5)
HGB BLD-MCNC: 11.5 G/DL (ref 12.1–17)
LDLC SERPL CALC-MCNC: 11.8 MG/DL (ref 0–100)
Lab: ABNORMAL
MCH RBC QN AUTO: 34.2 PG (ref 26–34)
MCHC RBC AUTO-ENTMCNC: 32.4 G/DL (ref 30–36.5)
MCV RBC AUTO: 105.7 FL (ref 80–99)
METHADONE UR QL: NEGATIVE
NRBC # BLD: 0 K/UL (ref 0–0.01)
NRBC BLD-RTO: 0 PER 100 WBC
OPIATES UR QL: NEGATIVE
P2Y12 PLT RESPONSE: 221 PRU (ref 194–418)
PCP UR QL: NEGATIVE
PLATELET # BLD AUTO: 162 K/UL (ref 150–400)
PMV BLD AUTO: 10.5 FL (ref 8.9–12.9)
RBC # BLD AUTO: 3.36 M/UL (ref 4.1–5.7)
SODIUM UR-SCNC: 85 MMOL/L
SPECIMEN HOLD: NORMAL
TRIGL SERPL-MCNC: 96 MG/DL
VIT B12 SERPL-MCNC: 516 PG/ML (ref 193–986)
VLDLC SERPL CALC-MCNC: 19.2 MG/DL
WBC # BLD AUTO: 4.2 K/UL (ref 4.1–11.1)

## 2023-09-09 PROCEDURE — 95816 EEG AWAKE AND DROWSY: CPT | Performed by: PSYCHIATRY & NEUROLOGY

## 2023-09-09 PROCEDURE — 85027 COMPLETE CBC AUTOMATED: CPT

## 2023-09-09 PROCEDURE — 92523 SPEECH SOUND LANG COMPREHEN: CPT

## 2023-09-09 PROCEDURE — 99223 1ST HOSP IP/OBS HIGH 75: CPT | Performed by: PSYCHIATRY & NEUROLOGY

## 2023-09-09 PROCEDURE — 83036 HEMOGLOBIN GLYCOSYLATED A1C: CPT

## 2023-09-09 PROCEDURE — 36415 COLL VENOUS BLD VENIPUNCTURE: CPT

## 2023-09-09 PROCEDURE — 2580000003 HC RX 258: Performed by: INTERNAL MEDICINE

## 2023-09-09 PROCEDURE — 6370000000 HC RX 637 (ALT 250 FOR IP): Performed by: PSYCHIATRY & NEUROLOGY

## 2023-09-09 PROCEDURE — 80307 DRUG TEST PRSMV CHEM ANLYZR: CPT

## 2023-09-09 PROCEDURE — 82570 ASSAY OF URINE CREATININE: CPT

## 2023-09-09 PROCEDURE — 84300 ASSAY OF URINE SODIUM: CPT

## 2023-09-09 PROCEDURE — 6360000002 HC RX W HCPCS: Performed by: INTERNAL MEDICINE

## 2023-09-09 PROCEDURE — 1100000003 HC PRIVATE W/ TELEMETRY

## 2023-09-09 PROCEDURE — 97165 OT EVAL LOW COMPLEX 30 MIN: CPT

## 2023-09-09 PROCEDURE — 6370000000 HC RX 637 (ALT 250 FOR IP): Performed by: STUDENT IN AN ORGANIZED HEALTH CARE EDUCATION/TRAINING PROGRAM

## 2023-09-09 PROCEDURE — 92610 EVALUATE SWALLOWING FUNCTION: CPT

## 2023-09-09 PROCEDURE — 80061 LIPID PANEL: CPT

## 2023-09-09 PROCEDURE — 85576 BLOOD PLATELET AGGREGATION: CPT

## 2023-09-09 PROCEDURE — 97161 PT EVAL LOW COMPLEX 20 MIN: CPT

## 2023-09-09 PROCEDURE — 93306 TTE W/DOPPLER COMPLETE: CPT | Performed by: STUDENT IN AN ORGANIZED HEALTH CARE EDUCATION/TRAINING PROGRAM

## 2023-09-09 PROCEDURE — 94761 N-INVAS EAR/PLS OXIMETRY MLT: CPT

## 2023-09-09 PROCEDURE — 97535 SELF CARE MNGMENT TRAINING: CPT

## 2023-09-09 PROCEDURE — 70551 MRI BRAIN STEM W/O DYE: CPT

## 2023-09-09 PROCEDURE — 2580000003 HC RX 258: Performed by: STUDENT IN AN ORGANIZED HEALTH CARE EDUCATION/TRAINING PROGRAM

## 2023-09-09 RX ORDER — ATORVASTATIN CALCIUM 20 MG/1
40 TABLET, FILM COATED ORAL NIGHTLY
Status: DISCONTINUED | OUTPATIENT
Start: 2023-09-09 | End: 2023-09-11 | Stop reason: HOSPADM

## 2023-09-09 RX ADMIN — SODIUM CHLORIDE, PRESERVATIVE FREE 10 ML: 5 INJECTION INTRAVENOUS at 20:53

## 2023-09-09 RX ADMIN — CLOPIDOGREL BISULFATE 75 MG: 75 TABLET ORAL at 05:57

## 2023-09-09 RX ADMIN — OXYBUTYNIN CHLORIDE 10 MG: 5 TABLET, EXTENDED RELEASE ORAL at 05:52

## 2023-09-09 RX ADMIN — THIAMINE HCL TAB 100 MG 100 MG: 100 TAB at 05:53

## 2023-09-09 RX ADMIN — THIAMINE HYDROCHLORIDE 500 MG: 100 INJECTION, SOLUTION INTRAMUSCULAR; INTRAVENOUS at 13:51

## 2023-09-09 RX ADMIN — ATORVASTATIN CALCIUM 40 MG: 20 TABLET, FILM COATED ORAL at 20:53

## 2023-09-09 RX ADMIN — ASPIRIN 81 MG: 81 TABLET, COATED ORAL at 05:53

## 2023-09-09 RX ADMIN — METOPROLOL SUCCINATE 12.5 MG: 25 TABLET, EXTENDED RELEASE ORAL at 05:56

## 2023-09-09 RX ADMIN — THIAMINE HYDROCHLORIDE 500 MG: 100 INJECTION, SOLUTION INTRAMUSCULAR; INTRAVENOUS at 22:19

## 2023-09-09 ASSESSMENT — PAIN SCALES - GENERAL
PAINLEVEL_OUTOF10: 0

## 2023-09-09 NOTE — CARE COORDINATION
[facility] attempted to assess patient, speech therapist at bedside. Will return at a later time.   Duke Smith

## 2023-09-09 NOTE — PROGRESS NOTES
Speech LAnguage Pathology EVALUATION/DISCHARGE    Patient: Luis Enrique Castano (83 y.o. male)  Date: 2023  Primary Diagnosis: Dysarthria [R47.1]  Altered mental state [R41.82]  Generalized weakness [R53.1]  Stroke-like symptom [R29.90]  Acute CVA (cerebrovascular accident) [facility]) [I63.9]       Precautions:  Fall Risk, Bed Alarm                  ASSESSMENT : L TP CVA in . Has had OP therapy since. Based on the objective data described below, the patient presents with functional swallow. Tolerating all textures. He is currently nonfluent with neologisms and paraphasic errors. Naming deficits noted. Very little intelligible speech due to aphasia. He repeated a few simple words accurately but otherwise paraphasic errors noted. Stated his name and  but very little effective communication. Mod receptive aphasia and mod to severe exp aphasia. He is following all one and some 2 step commands. Simple y/n questions were highly accurate. He is not at current baseline and has not had an MRI yet. PLAN :  Recommendations and Planned Interventions:  Diet: Regular  Thins      Acute SLP Services:if his Mri is positive for new stroke. Discharge Recommendations:      SUBJECTIVE:   Patient stated, his name accurately but not his age.      OBJECTIVE:     Past Medical History:   Diagnosis Date    Anemia 10/28/2020    Anxiety 10/28/2020    Basal cell carcinoma (BCC) of skin of face 2021    CAD (coronary artery disease) 1997    stents    Depression 2019    Ill-defined condition     High cholesterole    Insomnia 10/28/2020    Low hemoglobin     Peripheral arterial occlusive disease ([address]) 2020    Primary erectile dysfunction 10/28/2020    Vitamin D deficiency 10/28/2020     Past Surgical History:   Procedure Laterality Date    COLONOSCOPY      COLONOSCOPY N/A 2018    COLONOSCOPY performed by Lucero Velásquez MD at [address] [place]      into vein OTHER SURGICAL HISTORY      leg surgery procedure; both legs multi surgeries for blockages    OTHER SURGICAL HISTORY      insertion of stent into vein    OTHER SURGICAL HISTORY      complete repair of rotator cuff left shoulder    [place] UNLISTED PROCEDURE CARDIAC SURGERY  1997    heart and leg stent     ROTATOR CUFF REPAIR      Complete on left shoulder    UPPER GASTROINTESTINAL ENDOSCOPY  2021    VASCULAR SURGERY  ONGOING    PERIFERAL ARTERY DISEASE     Prior Level of Function/Home Situation:   Social/Functional History  Lives With: Spouse  Type of Home: House  Home Layout: Able to Live on Main level with bedroom/bathroom, Two level  Home Access: Stairs to enter with rails  Entrance Stairs - Number of Steps: garage 6-8  Bathroom Shower/Tub: Walk-in shower  Bathroom Equipment: Grab bars in shower  ADL Assistance: [address] Independent  Ambulation Assistance: Independent  Transfer Assistance: Independent  Active : Yes    Baseline Assessment:      Has baseline aphasia. Had improved some since his stroke but now closer to speech directly after his stroke. Cognitive and Communication Status:  Neurologic State: Alert  Orientation Level: Oriented to person  Cognition: Decreased command following    Dysphagia:  Oral Assessment:  Oral Motor   Labial: No impairment  Dentition: Partials (comment)  Lingual: No impairment  Mandible: No impairment  P.O. Trials: Tolerated thins via cup and soft solids well. Had eaten lunch with no dysphagia by report. Language Comprehension and Expression:  Auditory Comprehension  Comprehension: Exceptions  Simple yes/no questions: WFL  Complex yes/no questions: Moderate  One Step Commands: WFL  Two Step Commands:  Moderate (50% acc)     Expression  Primary Mode of Expression: Verbal  Verbal Expression  Verbal Expression: Exceptions to functional limits  Initiation: WFL  Repetition: Moderate  Automatic Speech: WFL  Confrontation:

## 2023-09-09 NOTE — PLAN OF CARE
Problem: Physical Therapy - Adult  Goal: By Discharge: Performs mobility at highest level of function for planned discharge setting. See evaluation for individualized goals. Description: FUNCTIONAL STATUS PRIOR TO ADMISSION: The patient and/or family endorse several falls within the last 2 months. HOME SUPPORT PRIOR TO ADMISSION: The patient lived with spouse and per daughter they both have frequent falls. Pt was driving and completing ADL/IADL independently. Physical Therapy Goals  Initiated 9/9/2023  1. Patient will move from supine to sit and sit to supine in bed with independence within 7 day(s). 2.  Patient will perform sit to stand with independence within 7 day(s). 3.  Patient will transfer from bed to chair and chair to bed with independence using the least restrictive device within 7 day(s). 4.  Patient will ambulate with independence for 250 feet with the least restrictive device within 7 day(s). 5.  Patient will ascend/descend 8 stairs with unilateral handrail(s) with modified independence within 7 day(s). Outcome: Progressing   PHYSICAL THERAPY EVALUATION    Patient: Poornima Pan (67 y.o. male)  Date: 9/9/2023  Primary Diagnosis: Dysarthria [R47.1]  Altered mental state [R41.82]  Generalized weakness [R53.1]  Stroke-like symptom [R29.90]  Acute CVA (cerebrovascular accident) [facility]) [I63.9]       Precautions: Fall Risk                  ASSESSMENT :   DEFICITS/IMPAIRMENTS:   The patient is limited by decreased functional mobility, high-level IADLs, safety awareness, cognition, coordination. Pt was visiting spouse in this hospital when found to have worsening dysarthria and AMS. Pt oriented x3, however with significant aphasia this date. Pt able to complete functional mobility and gait trials with SBA-CGA due to safety awareness and and decreased walking stability.  Pt has benefited from [address] in the past per daughter, and recommend upon D/C with increased support and supervision with Supine: Supervision  Scooting: Supervision  Transfers:     Transfer Training  Transfer Training: Yes  Overall Level of Assistance: Stand-by assistance  Interventions: Safety awareness training  Sit to Stand: Stand-by assistance  Stand to Sit: Stand-by assistance  Balance:     Balance  Sitting: Impaired  Sitting - Static: Good (unsupported)  Sitting - Dynamic: Fair (occasional)  Standing: Impaired  Standing - Static: Fair  Standing - Dynamic: Fair  Ambulation/Gait Training:    Gait  Overall Level of Assistance: Contact-guard assistance;Assist X1  Interventions: Verbal cues; Safety awareness training  Speed/Landy: Accelerated  Step Length: Left shortened;Right shortened  Distance (ft): 150 Feet  Assistive Device: Gait belt                                                                                                                                                                                                                               Dynamic Gait Index    Dynamic Gait Index  Gait Level Surface: Mild Impairment: Walks 20', uses AD, slower speed, mild gait deviations  Change in Gait Speed: Mild Impairment: Is able to change speed, but demonstrates mild gait deviations or no gait deviations, but is unable to achieve a significant change in velocity, or uses an assistive device  Gait With Horizontal Head Turns: Mild Impairment:  Performs head turns smoothly with a slight change in gait velocity (i.e. minor disruption to smooth gait path or uses AD  Gait With Vertical Head Turns: Moderate Impairment: Performs task with moderate change in gait velocity, slows down, staggers but recovers, can continue to walk  Gait and Pivot Turn: Mild Impairment:  Pivot turns safely in > 3 seconds and stops with no loss of balance  Step Over Obstacle: Moderate Impairment:  Is able to step over box but must stop, then step over, may require verbal cueing.   Step Around Obstacles: Normal:  Is able to walk around cones safely without

## 2023-09-09 NOTE — CONSULTS
INPATIENT NEUROLOGY CONSULT NOTE    NAME:   Alexis Magdaleno  MRN:   [id number]  ADMISSION DATE:  9/8/2023 11:41 AM    REFERRING PHYSICIAN:  Taylor Glasre M.D. REASON FOR CONSULT:  slurred speech, AMS    HPI:  82 y.o. male who  has a past medical history of Anemia, Anxiety, Basal cell carcinoma (BCC) of skin of face, CAD (coronary artery disease), Depression, Ill-defined condition, Insomnia, Low hemoglobin, Peripheral arterial occlusive disease ([address]), Primary erectile dysfunction, and Vitamin D deficiency. Hx is from Daughter at bedside. She explains pt had stroke in Nov 2022 (Huseyin-Schmitz, thrombectomy) and since then has had speech difficulty (otherwise functional, walking, feeding self, etc), though significantly improved with speech therapy. Pt drove to Hospital (which Daughter is shocked that he did so)  to visit Wife who is admitted here and was sitting in bedside chair in her room when daughter/ family noticed that he seemed lethargic/ poorly responsive. He was taken down to ER for evaluation, then admitted for stroke workup. Pt is awake, alert but has significant expressive aphasia, which daughter says is worse compared to his baseline. I personally reviewed the CT Head w/o contrast 9-8-23 and my interpretation is as follows: Moderate generalized cerebral atrophy, subcortical hypodensities suggestive of chronic small vessel ischemic changes, medium to large area of encephalomalacia in the left posterior temporal-occipital area suggestive of old stroke, no hydrocephalus, no intracranial hemorrhage, no evidence of acute intracranial abnormality on this modality    CT head w/o contrast 9-8-23 report:  Generalized prominence of cerebral sulci and ventricles is increased but stable. Periventricular white matter low-density is mild to moderate and stable. Focal encephalomalacia in the left posterior temporoparietal lobe is stable. . There is no intracranial hemorrhage, extra-axial collection, or mass effect. The basilar cisterns are open. No CT evidence of acute infarct. The bone windows demonstrate no abnormalities. The visualized portions of the paranasal sinuses and mastoid air cells are clear. IMPRESSION:  1. No acute intracranial abnormality. 2. Stable microvascular ischemic and old ischemic injury. CTA Head/ Neck and CT Brain Perfusion report 9-8-23: 1. No evidence for acute large vessel arterial occlusion. 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 Other  mild to moderate atherosclerotic changes as described above. No evidence for acute ischemic perfusion abnormality. 2. Moderate emphysema. 3. Severe cervical spondylosis. Brain MRI pending    TTE report pending    EEG 9-8-23: Abnormal EEG     Moderate generalized slowing consistent with an encephalopathic process, not specific as to cause. No epileptiform discharges were seen. Single lead EKG showed irregular cardiac rhythm. Recommend correlation with history and 12 lead EKG if necessary. Clinical correlation is necessary. Reviewed labs from yesterday to today:   CBC with normal WBC, H&H, and platelet count. CMP with normal sodium potassium, elevated creatinine 1.72, reduced GFR 40, normal AST ALT, mild elevated alkaline phosphatase 133. INR 1.0, serum alcohol less than 10, TSH 0.56 (normal), RPR pending, vitamin B12 516, folate 19.8, urine drug screen positive for amphetamines, repeat CBC with normal WBC, mild low hemoglobin 11.5, mild low hematocrit 35.5, normal platelet count. A1c 4.9. Total cholesterol 90, triglyceride 96, HDL 59, LDL 11.8.       P2Y12 PLT Response  (non-medicated reference range is 194 - 418 )    Aspirin  (non-medicated reference range is  > or = 550 ARU)      =====================================    Vitals:    09/09/23 0336 09/09/23 0554 09/09/23 0745 09/09/23 1156   BP: 132/69 138/72 134/70 108/62   Pulse: 63 62 59 66   Resp: 18  16 16   Temp:

## 2023-09-09 NOTE — PROGRESS NOTES
2150 Transfer IN report     Verbal transfer report given to Kya (oncoming nurse) by Martin Lu (offgoing nurse). Report included the following information Nurse Handoff Report, ED SBAR, Adult Overview, Intake/Output, MAR, Recent Results, Med Rec Status, and Cardiac Rhythm NSR . Stroke Education provided to patient and the following topics were discussed    1. Patients personal risk factors for stroke are smoking and previous CVA    2. Warning signs of Stroke:        * Sudden numbness or weakness of the face, arm or leg, especially on one side of          The body            * Sudden confusion, trouble speaking or understanding        * Sudden trouble seeing in one or both eyes        * Sudden trouble walking, dizziness, loss of balance or coordination        * Sudden severe headache with no known cause      3. Importance of activation Emergency Medical Services ( 9-1-1 ) immediately if experience any warning signs of stroke. 4. Be sure and schedule a follow-up appointment with your primary care doctor or any specialists as instructed. 5. You must take medicine every day to treat your risk factors for stroke. Be sure to take your medicines exactly as your doctor tells you: no more, no less. Know what your medicines are for , what they do. Anti-thrombotics /anticoagulants can help prevent strokes. You are taking the following medicine(s)  Aspirin, Lipitor, Plavix      6. Smoking and second-hand smoke greatly increase your risk of stroke, cardiovascular disease and death. Smoking history packs, 1.5 per day    7. Information provided was BS Stroke Education Binder, Stroke Handouts, or Verbal Education    8. Documentation of teaching completed in Patient Education Activity and on Care Plan with teaching response noted? yes     0700 Bedside shift change report given to [facility] INOCENTE SINGER (oncoming nurse) by Kya (offgoing nurse).  Report included the following information Nurse Handoff Report, Adult

## 2023-09-09 NOTE — PROGRESS NOTES
0700 Bedside and Verbal shift change report given to Jayce Gonzales RN (oncoming nurse) by Kristyn Zhou RN (offgoing nurse). Report included the following information Nurse Handoff Report, Index, Intake/Output, MAR, Recent Results, and Cardiac Rhythm NSR .     1045 Pt's son-in-law at bedside. MRI screening form given to him to fill out.

## 2023-09-09 NOTE — PLAN OF CARE
Problem: Occupational Therapy - Adult  Goal: By Discharge: Performs self-care activities at highest level of function for planned discharge setting. See evaluation for individualized goals. Description: FUNCTIONAL STATUS PRIOR TO ADMISSION:  patient lives at home with his wife(currently admitted). Patient was indep with ADL tasks and transfers without AD. Hx of stroke last year with residual expressive aphasia. Wife with multiple falls. ADL Assistance: Independent,  ,  ,  ,  ,  , Homemaking Assistance: Independent, Ambulation Assistance: Independent, Transfer Assistance: Independent, Active : Yes     HOME SUPPORT: Patient lived wife who he assisted secondary to multiple falls. Daughter in area. Occupational Therapy Goals:  Initiated 9/9/2023  1. Patient will perform bathing with Supervision within 7 day(s). 2.  Patient will perform simple home management with Supervision within 7 day(s). 3.  Patient will perform shower transfer with Supervision within 7 day(s). 4.  Patient will perform toilet transfers with Supervision  within 7 day(s). 5.  Patient will perform all aspects of toileting with Supervision within 7 day(s). 6.  Patient will participate in upper extremity therapeutic exercise/activities with Supervision for 5 minutes within 7 day(s). 7.  Patient will utilize energy conservation techniques during functional activities with verbal cues within 7 day(s).    Outcome: Progressing   OCCUPATIONAL THERAPY EVALUATION    Patient: Lisa Padron (5 y.o. male)  Date: 9/9/2023  Primary Diagnosis: Dysarthria [R47.1]  Altered mental state [R41.82]  Generalized weakness [R53.1]  Stroke-like symptom [R29.90]  Acute CVA (cerebrovascular accident) [facility]) [I63.9]       Precautions: Fall Risk, Bed Alarm                  ASSESSMENT :  The patient is limited by decreased functional mobility, independence in ADLs, high-level IADLs, strength, body mechanics, endurance, safety awareness, cognition, 10/28/2020     Past Surgical History:   Procedure Laterality Date    COLONOSCOPY  2021    COLONOSCOPY N/A 6/13/2018    COLONOSCOPY performed by Christie Walker MD at [address] [place]      into vein    HERNIA REPAIR  04/2020    OTHER SURGICAL HISTORY      leg surgery procedure; both legs multi surgeries for blockages    OTHER SURGICAL HISTORY      insertion of stent into vein    OTHER SURGICAL HISTORY      complete repair of rotator cuff left shoulder    TX UNLISTED PROCEDURE CARDIAC SURGERY  1997    heart and leg stent     ROTATOR CUFF REPAIR      Complete on left shoulder    UPPER GASTROINTESTINAL ENDOSCOPY  2021    VASCULAR SURGERY  ONGOING    PERIFERAL ARTERY DISEASE          Expanded or extensive additional review of patient history:   Lives With: Spouse  Type of Home: House  Home Layout: Able to Live on Main level with bedroom/bathroom, Two level  Home Access: Stairs to enter with rails        Bathroom Shower/Tub: Walk-in shower     Bathroom Equipment: Grab bars in shower                 EXAMINATION OF PERFORMANCE DEFICITS:    Cognitive/Behavioral Status:  Orientation  Overall Orientation Status:  (expressive aphasia- hx of stroke)  Orientation Level: Oriented to place;Oriented to time;Oriented to person;Disoriented to situation  Cognition  Overall Cognitive Status: Exceptions  Attention Span: Attends with cues to redirect  Safety Judgement: Decreased awareness of need for assistance;Decreased awareness of need for safety  Cognition Comment: Unable to fully assess due to expressive aphasia    Hearing:   Hearing  Hearing: Within functional limits    Vision/Perceptual:          Vision  Vision: Within Functional Limits       Range of Motion:   AROM: Within functional limits         Strength:  Strength: Generally decreased, functional      Coordination:  Coordination: Within functional limits     Coordination: Generally decreased, functional      Functional Mobility and Transfers for ADLs:    Bed Mobility:     Bed Mobility Training  Bed Mobility Training: Yes  Overall Level of Assistance: Supervision  Interventions: Safety awareness training;Verbal cues  Supine to Sit: Supervision  Sit to Supine: Supervision  Scooting: Supervision    Transfers:     Transfer Training  Transfer Training: Yes  Overall Level of Assistance: Stand-by assistance  Interventions: Safety awareness training  Sit to Stand: Stand-by assistance  Stand to Sit: Stand-by assistance            Balance:  Standing: Impaired  Balance  Sitting: Impaired  Sitting - Static: Good (unsupported)  Sitting - Dynamic: Fair (occasional)  Standing: Impaired  Standing - Static: Fair  Standing - Dynamic: Fair        ADL Assessment:          Feeding: Independent     Grooming: Stand by assistance  Grooming Skilled Clinical Factors: standing    UE Bathing: Setup     LE Bathing: Contact guard assistance     UE Dressing: Setup     LE Dressing: Contact guard assistance     Toileting: Stand by assistance       ADL Intervention and task modifications:    Patient instructed and indicated understanding the benefits of maintaining activity tolerance, functional mobility, and independence with self care tasks during acute stay  to ensure safe return home and to baseline. Encouraged patient to increase frequency and duration OOB, be out of bed for all meals, perform daily ADLs (as approved by RN/MD regarding bathing etc), and performing functional mobility to/from bathroom with assist.        Pain Rating:  No c /o pain      Activity Tolerance:   Fair     After treatment:   Patient left in no apparent distress in bed, Call bell within reach, Bed/ chair alarm activated, and Caregiver / family present    COMMUNICATION/EDUCATION:   The patient's plan of care was discussed with: physical therapist and registered nurse    Patient Education  Education Given To: Patient; Family  Education Provided: Role of Therapy;Plan of Care;Transfer Training; Fall

## 2023-09-09 NOTE — PROGRESS NOTES
Report received and care assumed. Sitting up in bed, watching TV and family is present. Denies any complaints of pain. NSR. VSS.  IV SL.
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
## 2023-09-09 NOTE — PROCEDURES
Name:   Piper Echavarria  Age/ Sex:   82 y.o. male     Procedure:   EEG     Date of Recordin23    Date of Interpretation:    23    Interpreting Physician: Sada Robles MD     Indication:    1. Stroke-like symptom    2. Dysarthria    3. Generalized weakness    4.  Acute CVA (cerebrovascular accident) ([address])        Current Facility-Administered Medications:     sodium chloride flush 0.9 % injection 5-40 mL, 5-40 mL, IntraVENous, 2 times per day, Prieto Baxter MD, 10 mL at 23 2244    sodium chloride flush 0.9 % injection 5-40 mL, 5-40 mL, IntraVENous, PRN, Prieto Baxter MD    0.9 % sodium chloride infusion, , IntraVENous, PRN, Prieto Baxter MD    ondansetron (ZOFRAN-ODT) disintegrating tablet 4 mg, 4 mg, Oral, Q8H PRN **OR** ondansetron (ZOFRAN) injection 4 mg, 4 mg, IntraVENous, Q6H PRN, Prieto Baxter MD    polyethylene glycol (GLYCOLAX) packet 17 g, 17 g, Oral, Daily PRN, Prieto Baxter MD    atorvastatin (LIPITOR) tablet 80 mg, 80 mg, Oral, Nightly, Prieto Baxter MD, 80 mg at 23 224    labetalol (NORMODYNE;TRANDATE) injection 10 mg, 10 mg, IntraVENous, Q10 Min PRN, Prieto Baxter MD    nicotine (NICODERM CQ) 21 MG/24HR 1 patch, 1 patch, TransDERmal, Daily, Prieto Baxter MD, 1 patch at 23 0557    PHENobarbital (LUMINAL) injection 130 mg, 130 mg, IntraVENous, Q15 Min PRN, Prieto Baxter MD    0.9 % sodium chloride infusion, , IntraVENous, PRN, Prieto Baxter MD    thiamine mononitrate tablet 100 mg, 100 mg, Oral, Daily, Prieto Baxter MD, 100 mg at 23 0553    LORazepam (ATIVAN) tablet 1 mg, 1 mg, Oral, Q1H PRN **OR** LORazepam (ATIVAN) injection 1 mg, 1 mg, IntraVENous, Q1H PRN **OR** LORazepam (ATIVAN) tablet 2 mg, 2 mg, Oral, Q1H PRN **OR** LORazepam (ATIVAN) injection 2 mg, 2 mg, IntraVENous, Q1H PRN **OR** LORazepam (ATIVAN) tablet 3 mg, 3 mg, Oral, Q1H PRN **OR** LORazepam (ATIVAN) injection 3 mg, 3 mg, IntraVENous, Q1H PRN **OR** LORazepam (ATIVAN) tablet 4 mg, 4 mg,

## 2023-09-10 PROCEDURE — 2580000003 HC RX 258: Performed by: STUDENT IN AN ORGANIZED HEALTH CARE EDUCATION/TRAINING PROGRAM

## 2023-09-10 PROCEDURE — 2580000003 HC RX 258: Performed by: INTERNAL MEDICINE

## 2023-09-10 PROCEDURE — 94761 N-INVAS EAR/PLS OXIMETRY MLT: CPT

## 2023-09-10 PROCEDURE — 99232 SBSQ HOSP IP/OBS MODERATE 35: CPT | Performed by: PSYCHIATRY & NEUROLOGY

## 2023-09-10 PROCEDURE — 6360000002 HC RX W HCPCS: Performed by: INTERNAL MEDICINE

## 2023-09-10 PROCEDURE — 1100000000 HC RM PRIVATE

## 2023-09-10 PROCEDURE — 6370000000 HC RX 637 (ALT 250 FOR IP): Performed by: STUDENT IN AN ORGANIZED HEALTH CARE EDUCATION/TRAINING PROGRAM

## 2023-09-10 PROCEDURE — 6370000000 HC RX 637 (ALT 250 FOR IP): Performed by: PSYCHIATRY & NEUROLOGY

## 2023-09-10 RX ADMIN — ASPIRIN 81 MG: 81 TABLET, COATED ORAL at 08:49

## 2023-09-10 RX ADMIN — METOPROLOL SUCCINATE 12.5 MG: 25 TABLET, EXTENDED RELEASE ORAL at 08:49

## 2023-09-10 RX ADMIN — OXYBUTYNIN CHLORIDE 10 MG: 5 TABLET, EXTENDED RELEASE ORAL at 08:49

## 2023-09-10 RX ADMIN — CLOPIDOGREL BISULFATE 75 MG: 75 TABLET ORAL at 08:49

## 2023-09-10 RX ADMIN — ATORVASTATIN CALCIUM 40 MG: 20 TABLET, FILM COATED ORAL at 21:00

## 2023-09-10 RX ADMIN — THIAMINE HYDROCHLORIDE 500 MG: 100 INJECTION, SOLUTION INTRAMUSCULAR; INTRAVENOUS at 08:50

## 2023-09-10 RX ADMIN — SODIUM CHLORIDE, PRESERVATIVE FREE 10 ML: 5 INJECTION INTRAVENOUS at 08:50

## 2023-09-10 RX ADMIN — SODIUM CHLORIDE, PRESERVATIVE FREE 10 ML: 5 INJECTION INTRAVENOUS at 20:59

## 2023-09-10 RX ADMIN — THIAMINE HYDROCHLORIDE 500 MG: 100 INJECTION, SOLUTION INTRAMUSCULAR; INTRAVENOUS at 17:33

## 2023-09-10 RX ADMIN — THIAMINE HYDROCHLORIDE 500 MG: 100 INJECTION, SOLUTION INTRAMUSCULAR; INTRAVENOUS at 22:09

## 2023-09-10 ASSESSMENT — PAIN SCALES - GENERAL
PAINLEVEL_OUTOF10: 0

## 2023-09-10 NOTE — PROGRESS NOTES
Hospitalist Progress Note      NAME:  Saumya Chapman   :  1940  MRM:  [id number]    Date/Time: 9/10/2023  9:56 AM           Assessment / Plan:     82years OLD CM with past medical history of CVA, severe alcoholism who presented to the emergency room today when was found by his family with altered mental status and slurred speech    #Aphasia/Encephalopathy: Presented as code stroke. CT perfusion neg. MRI showed \"moderate to severe chronic microvascular ischemic change and moderate to severe temporal predominant cerebral atrophy. Chronic encephalomalacia of the left parietal lobe. \"  Daughter at bedside feels he is back to baseline. We discuss that his heavy alcohol use is likely contributing and I also have c/f cognitive impairment   - f/u MRI   - Appreciate neuro recs; may need neuropsych eval Monday, unsafe to return home with his wife who is in similar position. Since IPR is not recommended[facility], but need to consider his capacity. Family very involved and working on custodial if possible   - High dose IV thiamine for possible Wernicke's   - Will ask Dr. Cristobal Miller to see, recs very much appreciated    #Alcoholism: Drinking 1-2L liquor daily. BAL neg on arrival. Likely contributing to the above   - high dose IV thiamine   - Monitor for w/d; hold scheduled pheonobarb as had been lethargic, but low threshold to resume; cont CIWA    #Hx CVA: 2022. Eval as above   - statin, dapt    #HTN: [place]      I have personally reviewed the radiographs, laboratory data in Epic and decisions and statements above are based partially on this personal interpretation.                  Care Plan discussed with: Patient, Family, Care Manager, Nursing Staff, and Consultant/Specialist    Discussed:  Care Plan    Prophylaxis:  SCD's    Disposition:  SAH/Rehab           ___________________________________________________    Attending Physician: Daryn Anderson MD        Subjective:     Chief Complaint:  No

## 2023-09-10 NOTE — PLAN OF CARE
Problem: Safety - Adult  Goal: Free from fall injury  Outcome: Progressing     Problem: Pain  Goal: Verbalizes/displays adequate comfort level or baseline comfort level  Outcome: Progressing     Problem: Skin/Tissue Integrity  Goal: Absence of new skin breakdown  Description: 1. Monitor for areas of redness and/or skin breakdown  2. Assess vascular access sites hourly  3. Every 4-6 hours minimum:  Change oxygen saturation probe site  4. Every 4-6 hours:  If on nasal continuous positive airway pressure, respiratory therapy assess nares and determine need for appliance change or resting period.   Outcome: Progressing     Problem: Neurosensory - Adult  Goal: Achieves stable or improved neurological status  Outcome: Progressing  Goal: Achieves maximal functionality and self care  Outcome: Progressing

## 2023-09-10 NOTE — PROGRESS NOTES
Brief Nutrition Assessment    Type and Reason for Visit: Brief Assessment     Nutrition Recommendations/Plan:   Continue ONS: Provide Ensure High Protein once daily (160 kcal, 19 g carbs, 16 g protein)  `     Nutrition Assessment:  Chart reviewed by weekend on-call dietitian. Positive MST alert was triggered based on results obtained during nursing admission assessment for Weight loss 14-23#. Nurse driven early ONS protocol was ordered. Full nutrition assessment will be completed per policy. Weight History: Wt Readings from Last 10 Encounters:   09/08/23 54.4 kg (120 lb)   07/27/23 61.3 kg (135 lb 3.2 oz)   07/19/23 63 kg (139 lb)   07/14/23 62.1 kg (137 lb)   07/10/23 63 kg (139 lb)   06/19/23 63.5 kg (140 lb)   06/13/23 64.3 kg (141 lb 12.8 oz)   05/30/23 64.4 kg (142 lb)   05/22/23 64.9 kg (143 lb)   05/15/23 65 kg (143 lb 3.2 oz)   ]    Diet Order:  ADULT ORAL NUTRITION SUPPLEMENT; PM Snack; Standard High Calorie/High Protein Oral Supplement  ADULT DIET; Regular; Low Fat/Low Chol/High Fiber/TING  DIET ONE TIME MESSAGE;    PO Intake Documented:   Meal Intake:   Patient Vitals for the past 168 hrs:   PO Meals Eaten (%)   09/09/23 0336 0%   09/08/23 2224 0%     Supplement Intake:  No data found.     Last BG:   Lab Results   Component Value Date/Time    GLUCOSE 99 09/08/2023 11:52 AM         Electronically signed by Anne Marie Anton RD  Contact: [phone] or via [employer]

## 2023-09-10 NOTE — PROGRESS NOTES
INPATIENT NEUROLOGY FOLLOW-UP NOTE    NAME:  Marty Costa  MRN:  [id number]  ADMISSION DATE:  9/8/2023 11:41 AM    REASON FOR FOLLOW UP:  Aphasia, hx stroke    Prior Data (this admission):     CT head w/o contrast 9-8-23 report:  Generalized prominence of cerebral sulci and ventricles is increased but stable. Periventricular white matter low-density is mild to moderate and stable. Focal encephalomalacia in the left posterior temporoparietal lobe is stable. . There is no intracranial hemorrhage, extra-axial collection, or mass effect. The basilar cisterns are open. No CT evidence of acute infarct. The bone windows demonstrate no abnormalities. The visualized portions of the paranasal sinuses and mastoid air cells are clear. IMPRESSION:  1. No acute intracranial abnormality. 2. Stable microvascular ischemic and old ischemic injury. CTA Head/ Neck and CT Brain Perfusion report 9-8-23: 1. No evidence for acute large vessel arterial occlusion. 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 Other  mild to moderate atherosclerotic changes as described above. No evidence for acute ischemic perfusion abnormality. 2. Moderate emphysema. 3. Severe cervical spondylosis. EEG 9-8-23: Abnormal EEG     Moderate generalized slowing consistent with an encephalopathic process, not specific as to cause. No epileptiform discharges were seen. Single lead EKG showed irregular cardiac rhythm. Recommend correlation with history and 12 lead EKG if necessary. Clinical correlation is necessary. Reviewed labs from 9-8-9-9:   CBC with normal WBC, H&H, and platelet count. CMP with normal sodium potassium, elevated creatinine 1.72, reduced GFR 40, normal AST ALT, mild elevated alkaline phosphatase 133.   INR 1.0, serum alcohol less than 10, TSH 0.56 (normal), RPR pending, vitamin B12 516, folate 19.8, urine drug screen positive for amphetamines, repeat CBC with normal WBC, mild 0.9 % sodium chloride infusion, , IntraVENous, PRN, Halina Marquez MD    ondansetron (ZOFRAN-ODT) disintegrating tablet 4 mg, 4 mg, Oral, Q8H PRN **OR** ondansetron (ZOFRAN) injection 4 mg, 4 mg, IntraVENous, Q6H PRN, Halina Marquez MD    polyethylene glycol (GLYCOLAX) packet 17 g, 17 g, Oral, Daily PRN, Halina Marquez MD    labetalol (NORMODYNE;TRANDATE) injection 10 mg, 10 mg, IntraVENous, Q10 Min PRN, Halina Marquez MD    nicotine (NICODERM CQ) 21 MG/24HR 1 patch, 1 patch, TransDERmal, Daily, Halina Marquez MD, 1 patch at 09/09/23 0557    PHENobarbital (LUMINAL) injection 130 mg, 130 mg, IntraVENous, Q15 Min PRN, Halina Marquez MD    0.9 % sodium chloride infusion, , IntraVENous, PRN, Halina Marquez MD    LORazepam (ATIVAN) tablet 1 mg, 1 mg, Oral, Q1H PRN **OR** LORazepam (ATIVAN) injection 1 mg, 1 mg, IntraVENous, Q1H PRN **OR** LORazepam (ATIVAN) tablet 2 mg, 2 mg, Oral, Q1H PRN **OR** LORazepam (ATIVAN) injection 2 mg, 2 mg, IntraVENous, Q1H PRN **OR** LORazepam (ATIVAN) tablet 3 mg, 3 mg, Oral, Q1H PRN **OR** LORazepam (ATIVAN) injection 3 mg, 3 mg, IntraVENous, Q1H PRN **OR** LORazepam (ATIVAN) tablet 4 mg, 4 mg, Oral, Q1H PRN **OR** LORazepam (ATIVAN) injection 4 mg, 4 mg, IntraVENous, Q1H PRN, Halina Marquez MD    aspirin EC tablet 81 mg, 81 mg, Oral, Daily, Halina Marquez MD, 81 mg at 09/10/23 0849    clopidogrel (PLAVIX) tablet 75 mg, 75 mg, Oral, Daily, Halina Marquez MD, 75 mg at 09/10/23 0849    metoprolol succinate (TOPROL XL) extended release tablet 12.5 mg, 12.5 mg, Oral, Daily, Halina Marquez MD, 12.5 mg at 09/10/23 0849    oxybutynin (DITROPAN-XL) extended release tablet 10 mg, 10 mg, Oral, Daily, Halina Marquez MD, 10 mg at 09/10/23 0849    HOME MEDS  Medications Prior to Admission: ALPRAZolam (XANAX) 0.25 MG tablet, TAKE ONE TABLET BY MOUTH THREE TIMES A DAY AS NEEDED FOR ANXIETY  clopidogrel (PLAVIX) 75 MG tablet, Take 1 tablet by mouth daily  oxybutynin (DITROPAN-XL) 10 MG extended release

## 2023-09-11 VITALS
RESPIRATION RATE: 18 BRPM | HEART RATE: 63 BPM | BODY MASS INDEX: 16.8 KG/M2 | HEIGHT: 71 IN | OXYGEN SATURATION: 99 % | SYSTOLIC BLOOD PRESSURE: 104 MMHG | WEIGHT: 120 LBS | TEMPERATURE: 99.1 F | DIASTOLIC BLOOD PRESSURE: 61 MMHG

## 2023-09-11 LAB — RPR SER QL: NONREACTIVE

## 2023-09-11 PROCEDURE — 2580000003 HC RX 258: Performed by: STUDENT IN AN ORGANIZED HEALTH CARE EDUCATION/TRAINING PROGRAM

## 2023-09-11 PROCEDURE — 99232 SBSQ HOSP IP/OBS MODERATE 35: CPT | Performed by: PSYCHIATRY & NEUROLOGY

## 2023-09-11 PROCEDURE — 2580000003 HC RX 258: Performed by: INTERNAL MEDICINE

## 2023-09-11 PROCEDURE — 6360000002 HC RX W HCPCS: Performed by: INTERNAL MEDICINE

## 2023-09-11 PROCEDURE — 6370000000 HC RX 637 (ALT 250 FOR IP): Performed by: STUDENT IN AN ORGANIZED HEALTH CARE EDUCATION/TRAINING PROGRAM

## 2023-09-11 PROCEDURE — 92507 TX SP LANG VOICE COMM INDIV: CPT

## 2023-09-11 RX ORDER — NICOTINE 21 MG/24HR
1 PATCH, TRANSDERMAL 24 HOURS TRANSDERMAL DAILY
Qty: 30 PATCH | Refills: 3 | Status: SHIPPED | OUTPATIENT
Start: 2023-09-12 | End: 2023-09-27 | Stop reason: ALTCHOICE

## 2023-09-11 RX ORDER — METOPROLOL SUCCINATE 25 MG/1
12.5 TABLET, EXTENDED RELEASE ORAL DAILY
Qty: 30 TABLET | Refills: 3 | Status: SHIPPED | OUTPATIENT
Start: 2023-09-12 | End: 2023-09-20 | Stop reason: ALTCHOICE

## 2023-09-11 RX ADMIN — METOPROLOL SUCCINATE 12.5 MG: 25 TABLET, EXTENDED RELEASE ORAL at 10:20

## 2023-09-11 RX ADMIN — CLOPIDOGREL BISULFATE 75 MG: 75 TABLET ORAL at 10:20

## 2023-09-11 RX ADMIN — ASPIRIN 81 MG: 81 TABLET, COATED ORAL at 10:20

## 2023-09-11 RX ADMIN — OXYBUTYNIN CHLORIDE 10 MG: 5 TABLET, EXTENDED RELEASE ORAL at 10:20

## 2023-09-11 RX ADMIN — SODIUM CHLORIDE, PRESERVATIVE FREE 10 ML: 5 INJECTION INTRAVENOUS at 10:24

## 2023-09-11 RX ADMIN — THIAMINE HYDROCHLORIDE 500 MG: 100 INJECTION, SOLUTION INTRAMUSCULAR; INTRAVENOUS at 04:20

## 2023-09-11 ASSESSMENT — PAIN SCALES - GENERAL
PAINLEVEL_OUTOF10: 0

## 2023-09-11 NOTE — DISCHARGE INSTRUCTIONS
HOSPITALIST DISCHARGE INSTRUCTIONS  NAME: Laura Guardado   :  1940   MRN:  [id number]     Date/Time:  2023 11:40 AM    ADMIT DATE: 2023     DISCHARGE DATE: 2023     ADMISSION DIAGNOSIS:    DISCHARGE DIAGNOSIS:  Aphasia/Encephalopathy  Alcoholism  History of stroke 2022                                                                                                                Care Plan discussed with: Patient, Family, Care Manager, Nursing Staff, and Consultant/Specialist     Discussed:  Care Plan     Prophylaxis:  SCD's     Disposition:  SAH/Rehab      DIET:  heart healthy    ACTIVITY:  as tolerated with assistance    OTHER INSTRUCTIONS:    Seek medical attention for recurrent altered mental status, aphasia, or symptoms of stroke. Continue PT/OT    MEDICATIONS:      It is important that you take the medication exactly as they are prescribed. Keep your medication in the bottles provided by the pharmacist and keep a list of the medication names, dosages, and times to be taken in your wallet. Do not take other medications without consulting your doctor. If you experience any of the following symptoms then please call your primary care physician or return to the emergency room if you cannot get hold of your doctor:  Fever, chills, nausea, vomiting, diarrhea, change in mentation, falling, bleeding, shortness of breath    Follow Up:  Please call and set up an appointment to see them in 1 week. Keke Tong MD  [address] [place] [address]  [phone]    Call  Request referral to Neuropsychiatry. Information obtained by :  I understand that if any problems occur once I am at home I am to contact my physician. I understand and acknowledge receipt of the instructions indicated above.                                                                                                                                                Physician's or

## 2023-09-11 NOTE — CARE COORDINATION
9/11/23  1:55 PM    CM noted dc order. CM sent resumption order to [facility]. They are able to accept- sent AVS. CM updated APS that patient is discharging today. Patients family is working on obtaining caregivers and moving patients to [facility]. No further CM needs noted. [address] [facility]     09/11/23 1349   Discharge Planning   Patient expects to be discharged to: [facility]   Transition of Care Consult (CM Consult) Home Health   Internal Home Health No   Reason Outside Agency Chosen Patient already serviced by other home care/hospice agency   Mode of Transport at Discharge Other (see comment)  (family)   Condition of Participation: Discharge Planning   The Patient and/or Patient Representative was provided with a Choice of Provider? Patient   The Patient and/Or Patient Representative agree with the Discharge Plan? Yes   Freedom of Choice list was provided with basic dialogue that supports the patient's individualized plan of care/goals, treatment preferences, and shares the quality data associated with the providers?   Yes  ([facility])

## 2023-09-11 NOTE — PLAN OF CARE
Problem: Safety - Adult  Goal: Free from fall injury  9/11/2023 1217 by Judy Murphy RN  Outcome: Adequate for Discharge  9/10/2023 2241 by Carly Shaikh RN  Outcome: Progressing     Problem: Pain  Goal: Verbalizes/displays adequate comfort level or baseline comfort level  9/11/2023 1217 by Judy Murphy RN  Outcome: Adequate for Discharge  9/10/2023 2241 by Carly Shaikh RN  Outcome: Progressing     Problem: Skin/Tissue Integrity  Goal: Absence of new skin breakdown  Description: 1. Monitor for areas of redness and/or skin breakdown  2. Assess vascular access sites hourly  3. Every 4-6 hours minimum:  Change oxygen saturation probe site  4. Every 4-6 hours:  If on nasal continuous positive airway pressure, respiratory therapy assess nares and determine need for appliance change or resting period.   9/11/2023 1217 by Judy Murphy RN  Outcome: Adequate for Discharge  9/10/2023 2241 by Carly Shaikh RN  Outcome: Progressing     Problem: Neurosensory - Adult  Goal: Achieves stable or improved neurological status  9/11/2023 1217 by Judy Murphy RN  Outcome: Adequate for Discharge  9/10/2023 2241 by Carly Shaikh RN  Outcome: Progressing  Goal: Achieves maximal functionality and self care  9/11/2023 1217 by Judy Murphy RN  Outcome: Adequate for Discharge  9/10/2023 2241 by Carly Shaikh RN  Outcome: Progressing     Problem: Chronic Conditions and Co-morbidities  Goal: Patient's chronic conditions and co-morbidity symptoms are monitored and maintained or improved  Outcome: Adequate for Discharge

## 2023-09-11 NOTE — PROGRESS NOTES
Discharge paperwork given to patient and reviewed with him. IV removed. Patient packed his own belongings and states son will be here soon to pick him up.    ALEXANDER De LunaN, RN
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
## 2023-09-12 ENCOUNTER — TELEPHONE (OUTPATIENT)
Facility: CLINIC | Age: 83
End: 2023-09-12

## 2023-09-12 NOTE — PROGRESS NOTES
Physician Progress Note      PATIENT:               Hanny Darnell  CSN #:                  [id number]  :                       1940  ADMIT DATE:       2023 11:41 AM  DISCH DATE:        2023 2:00 PM  RESPONDING  PROVIDER #:        Nilton Griffin MD        QUERY TEXT:    Stage of Chronic Kidney Disease: Please provide further specificity, if known. Clinical indicators include:  Options provided:  -- Chronic kidney disease stage 1  -- Chronic kidney disease stage 2  -- Chronic kidney disease stage 3  -- Chronic kidney disease stage 3a  -- Chronic kidney disease stage 3b  -- Chronic kidney disease stage 4  -- Chronic kidney disease stage 5  -- Chronic kidney disease stage 5, requiring dialysis  -- End stage renal disease  -- Other - I will add my own diagnosis  -- Disagree - Not applicable / Not valid  -- Disagree - Clinically Unable to determine / Unknown        PROVIDER RESPONSE TEXT:    Provider was unable to determine a response for this query. add GFR from chart to query          QUERY TEXT:    Pt admitted with AMS and slurred speech and has encephalopathy documented. If   possible, please document in progress notes and discharge summary further   specificity regarding the type of encephalopathy:    The medical record reflects the following:  Risk Factors: Hx:  CVA, alcoholism; CAD; High cholesterol; PAD;  Clinical Indicators: AMS; CT Head: Neg; CTA Head/Neck: NEG; CT Brain   Perfusion: NEG; MRI Brain: Moderate to severe chronic microvascular ischemic   change and moderate to severe temporal predominant cerebral atrophy; Chronic   encephalomalacia of the left parietal lobe. ..... Meggan Salk EEG: Moderate generalized   slowing consistent with an encephalopathic process, not specific as to cause.     No epileptiform discharges were seen    ED NOTED: Patient was last known well at 8 PM.  This morning family saw him at   8 AM trying to visit his family member in the hospital.  He is speech was   slurred, he precautions; Speech evaluation; Neurochecks   every 4; PT/OT; Admit to the ICU for close monitoring; CIWA protocol; EEG    Thank you,    Owen Villalpando  CDI  Options provided:  -- Alcoholic encephalopathy  -- Metabolic encephalopathy  -- Toxic encephalopathy  -- Drug-induced encephalopathy due to, Please specify substance. -- Encephalopathy due to *** (other, such as CVA or brain tumor)  -- Toxic metabolic encephalopathy  -- Wernicke's encephalopathy  -- Other - I will add my own diagnosis  -- Disagree - Not applicable / Not valid  -- Disagree - Clinically unable to determine / Unknown  -- Refer to Clinical Documentation Reviewer    PROVIDER RESPONSE TEXT:    This patient has metabolic encephalopathy.     Query created by: Owen Villalpando on 9/11/2023 1:11 PM      Electronically signed by:  Calista Kate MD 9/12/2023 12:59 AM

## 2023-09-12 NOTE — TELEPHONE ENCOUNTER
Care Transitions Initial Follow Up Call    Outreach made within 2 business days of discharge: Yes    Patient: Héctor Lehman Patient : 1940   MRN: [id number]  Reason for Admission: There are no discharge diagnoses documented for the most recent discharge.   Discharge Date: 23       Spoke with: Left voicemail    Discharge department/facility: [facility]    Scheduled appointment with PCP within 7-14 days    Follow Up  Future Appointments   Date Time Provider [address] [place] [address]   2023  1:00 PM WILL Torrez NP [facility] AM[facility]   2023  9:00 AM WILL Tejeda NP [facility]       Ananya Keyes LPN

## 2023-09-14 ENCOUNTER — TELEPHONE (OUTPATIENT)
Facility: CLINIC | Age: 83
End: 2023-09-14

## 2023-09-14 NOTE — TELEPHONE ENCOUNTER
Pt's daughter Uriah Jane came by office today and dropped off resident physical exam forms that need to be filled out by provider.  Blank forms are in nurse folder    Questions call Tabby at [phone]

## 2023-09-14 NOTE — PROGRESS NOTES
Physician Progress Note      PATIENT:               Robby Kingsley  CSN #:                  [id number]  :                       1940  ADMIT DATE:       2023 11:41 AM  DISCH DATE:        2023 2:00 PM  RESPONDING  PROVIDER #:        Crystal Luna MD          QUERY TEXT:    Patient admitted with AMS and Slurred speech. Documentation reflects \"TRELL/CKD\"   in H&P dated . If possible, please document in the progress notes and   discharge summary if \"TRELL\" was: The medical record reflects the following:  Risk Factors: Hx: Alcoholism; CAD; High cholesterol; PAD;  Clinical Indicators: Bun/Cr: 17/1.72; ESt . GFR: 40..... Ana Mckeon Previous admit   (): Bun/Cr: 15/1.26; Est. GFR: 58; Urine Cr: 70; Random Urine Sodium: 85     AP: ##TRELL/CKD  Pt BUN/Cr 17/1.72  Elevated from previous admission  FU  FeNa    Treatment: Monitor Cr; Urine Cr; Random Urine Sodium    Thank you,    Boni CORNELL    REFERENCE:    Documentation of ? Acute Renal Failure? is noted in the progress notes. Currently the patient does not meet RIFLE criteria (BSV approved) to support   this diagnosis. If you are using another criteria to support this diagnosis,   please document this in your progress note. Otherwise, please document in the   progress notes the clinical indicators that support this diagnosis or state   that the diagnosis has been ruled out.     RIFLE  (BSV Approved)    RISK:  Increased SCr x 1.5 or GFR decrease > 25% (within 7 days)    INJURY:  Increased SCr x 2.0 or GFR decreased > 50%    FAILURE:  Increased SCr x 3.0 or GFR decrease > 75% or SCr >4.0 mg/dL or acute   increase >0.5 mg/dL    LOSS:  Persistent acute renal failure = complete loss of kidney function > 4   weeks    END STAGE:  End stage of kidney disease > 3 months      AKIN    STAGE  1:  Increase in SCr >/= 0.3 mg/dL or >/= 150% to 200% (1.5 to 2-fold)   from baseline (within 48 hours)    STAGE  2:  Increase in SCr to more than 200% to 300% (>2-3 fold) from

## 2023-09-20 ENCOUNTER — OFFICE VISIT (OUTPATIENT)
Facility: CLINIC | Age: 83
End: 2023-09-20
Payer: MEDICARE

## 2023-09-20 VITALS
RESPIRATION RATE: 16 BRPM | BODY MASS INDEX: 18.48 KG/M2 | OXYGEN SATURATION: 100 % | TEMPERATURE: 97.2 F | WEIGHT: 132 LBS | HEART RATE: 63 BPM | HEIGHT: 71 IN | DIASTOLIC BLOOD PRESSURE: 50 MMHG | SYSTOLIC BLOOD PRESSURE: 110 MMHG

## 2023-09-20 DIAGNOSIS — I63.9 ACUTE CVA (CEREBROVASCULAR ACCIDENT) (HCC): ICD-10-CM

## 2023-09-20 DIAGNOSIS — I48.0 PAF (PAROXYSMAL ATRIAL FIBRILLATION) (HCC): ICD-10-CM

## 2023-09-20 DIAGNOSIS — I10 PRIMARY HYPERTENSION: ICD-10-CM

## 2023-09-20 DIAGNOSIS — Z91.199 PATIENT NON ADHERENCE: ICD-10-CM

## 2023-09-20 DIAGNOSIS — Z86.73 STATUS POST CVA: ICD-10-CM

## 2023-09-20 DIAGNOSIS — R41.82 ALTERED MENTAL STATUS, UNSPECIFIED ALTERED MENTAL STATUS TYPE: Primary | ICD-10-CM

## 2023-09-20 DIAGNOSIS — Z02.89 ENCOUNTER FOR COMPLETION OF FORM WITH PATIENT: ICD-10-CM

## 2023-09-20 DIAGNOSIS — I25.10 CORONARY ARTERY DISEASE WITHOUT ANGINA PECTORIS, UNSPECIFIED VESSEL OR LESION TYPE, UNSPECIFIED WHETHER NATIVE OR TRANSPLANTED HEART: ICD-10-CM

## 2023-09-20 DIAGNOSIS — R09.89 BILATERAL CAROTID BRUITS: ICD-10-CM

## 2023-09-20 DIAGNOSIS — R47.1 ANARTHRIA: ICD-10-CM

## 2023-09-20 DIAGNOSIS — F10.20 ALCOHOLISM (HCC): ICD-10-CM

## 2023-09-20 PROCEDURE — G8427 DOCREV CUR MEDS BY ELIG CLIN: HCPCS | Performed by: NURSE PRACTITIONER

## 2023-09-20 PROCEDURE — G8419 CALC BMI OUT NRM PARAM NOF/U: HCPCS | Performed by: NURSE PRACTITIONER

## 2023-09-20 PROCEDURE — 3074F SYST BP LT 130 MM HG: CPT | Performed by: NURSE PRACTITIONER

## 2023-09-20 PROCEDURE — 1111F DSCHRG MED/CURRENT MED MERGE: CPT | Performed by: NURSE PRACTITIONER

## 2023-09-20 PROCEDURE — 4004F PT TOBACCO SCREEN RCVD TLK: CPT | Performed by: NURSE PRACTITIONER

## 2023-09-20 PROCEDURE — 99214 OFFICE O/P EST MOD 30 MIN: CPT | Performed by: NURSE PRACTITIONER

## 2023-09-20 PROCEDURE — 1123F ACP DISCUSS/DSCN MKR DOCD: CPT | Performed by: NURSE PRACTITIONER

## 2023-09-20 PROCEDURE — 3078F DIAST BP <80 MM HG: CPT | Performed by: NURSE PRACTITIONER

## 2023-09-20 RX ORDER — ICOSAPENT ETHYL 1000 MG/1
2 CAPSULE ORAL 2 TIMES DAILY
COMMUNITY

## 2023-09-20 RX ORDER — PREGABALIN 100 MG/1
100 CAPSULE ORAL 2 TIMES DAILY
COMMUNITY

## 2023-09-20 RX ORDER — MIRTAZAPINE 15 MG/1
1 TABLET, FILM COATED ORAL DAILY
COMMUNITY
Start: 2021-12-01

## 2023-09-20 RX ORDER — AMITRIPTYLINE HYDROCHLORIDE 100 MG/1
100 TABLET ORAL NIGHTLY
COMMUNITY

## 2023-09-20 RX ORDER — FLUOXETINE HYDROCHLORIDE 20 MG/1
20 CAPSULE ORAL DAILY
COMMUNITY

## 2023-09-20 RX ORDER — LOSARTAN POTASSIUM 50 MG/1
50 TABLET ORAL DAILY
COMMUNITY

## 2023-09-20 RX ORDER — LEVOTHYROXINE SODIUM 0.07 MG/1
75 TABLET ORAL DAILY
COMMUNITY

## 2023-09-20 RX ORDER — DICLOFENAC POTASSIUM 50 MG/1
50 TABLET, FILM COATED ORAL 3 TIMES DAILY
COMMUNITY

## 2023-09-20 RX ORDER — DIVALPROEX SODIUM 250 MG/1
250 TABLET, DELAYED RELEASE ORAL 3 TIMES DAILY
COMMUNITY

## 2023-09-20 SDOH — ECONOMIC STABILITY: INCOME INSECURITY: HOW HARD IS IT FOR YOU TO PAY FOR THE VERY BASICS LIKE FOOD, HOUSING, MEDICAL CARE, AND HEATING?: NOT HARD AT ALL

## 2023-09-20 SDOH — ECONOMIC STABILITY: FOOD INSECURITY: WITHIN THE PAST 12 MONTHS, THE FOOD YOU BOUGHT JUST DIDN'T LAST AND YOU DIDN'T HAVE MONEY TO GET MORE.: NEVER TRUE

## 2023-09-20 SDOH — ECONOMIC STABILITY: FOOD INSECURITY: WITHIN THE PAST 12 MONTHS, YOU WORRIED THAT YOUR FOOD WOULD RUN OUT BEFORE YOU GOT MONEY TO BUY MORE.: NEVER TRUE

## 2023-09-20 SDOH — ECONOMIC STABILITY: HOUSING INSECURITY
IN THE LAST 12 MONTHS, WAS THERE A TIME WHEN YOU DID NOT HAVE A STEADY PLACE TO SLEEP OR SLEPT IN A SHELTER (INCLUDING NOW)?: NO

## 2023-09-20 NOTE — PROGRESS NOTES
Subjective    Chief Complaint   Patient presents with    Follow-Up from Hospital     Had seizure       HPI:    Maikel Jaquez is a 82 y.o. male. 82-year-old male presents for post discharge follow-up. He was admitted to the hospital on September 8, 2023 and discharged September 11, 2023. His admission diagnoses were altered mental status, generalized weakness, strokelike symptoms, and acute CVA. 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 Patient was discharged with a NicoDerm patch and his diet orders were for adult oral nutritional supplement, a p.m. snack, standard high-calorie high-protein oral supplement. Patient presents with his daughter today also for form completion to be admitted to assisted living. Patient states that he is still smoking although he was given patches for smoking cessation and patient also states that he does not drink water, only [place] and beer\". Patient was discharged with nicotine patches but states he is not stopping and a pack lasts 1.5 days. He also states that if allowed he will continue to drive and we talked about how unsafe this could be. Patient continues to be non- adherent and has poor insight.       Current Outpatient Medications on File Prior to Visit   Medication Sig Dispense Refill    Nutritional Supplements (ENSURE COMPLETE PO) Take by mouth      FLUoxetine (PROZAC) 20 MG capsule Take 1 capsule by mouth daily      mirtazapine (REMERON) 15 MG tablet Take 1 tablet by mouth daily      Omeprazole 20 MG TBDD Take by mouth      amitriptyline (ELAVIL) 100 MG tablet Take 1 tablet by mouth nightly      levothyroxine (SYNTHROID) 75 MCG tablet Take 1 tablet by mouth Daily      Icosapent Ethyl (VASCEPA) 1 g CAPS capsule Take 2 capsules by mouth 2 times daily      losartan (COZAAR) 50 MG tablet Take 1 tablet by mouth daily      diclofenac (CATAFLAM) 50 MG

## 2023-09-26 ENCOUNTER — OFFICE VISIT (OUTPATIENT)
Age: 83
End: 2023-09-26
Payer: MEDICARE

## 2023-09-26 ENCOUNTER — PATIENT MESSAGE (OUTPATIENT)
Age: 83
End: 2023-09-26

## 2023-09-26 VITALS
RESPIRATION RATE: 16 BRPM | HEART RATE: 50 BPM | TEMPERATURE: 97.8 F | DIASTOLIC BLOOD PRESSURE: 76 MMHG | SYSTOLIC BLOOD PRESSURE: 126 MMHG | OXYGEN SATURATION: 97 %

## 2023-09-26 DIAGNOSIS — R56.9 SEIZURE-LIKE ACTIVITY (HCC): ICD-10-CM

## 2023-09-26 DIAGNOSIS — Z09 HOSPITAL DISCHARGE FOLLOW-UP: Primary | ICD-10-CM

## 2023-09-26 PROCEDURE — G8419 CALC BMI OUT NRM PARAM NOF/U: HCPCS | Performed by: NURSE PRACTITIONER

## 2023-09-26 PROCEDURE — 1111F DSCHRG MED/CURRENT MED MERGE: CPT | Performed by: NURSE PRACTITIONER

## 2023-09-26 PROCEDURE — 4004F PT TOBACCO SCREEN RCVD TLK: CPT | Performed by: NURSE PRACTITIONER

## 2023-09-26 PROCEDURE — 1123F ACP DISCUSS/DSCN MKR DOCD: CPT | Performed by: NURSE PRACTITIONER

## 2023-09-26 PROCEDURE — 99215 OFFICE O/P EST HI 40 MIN: CPT | Performed by: NURSE PRACTITIONER

## 2023-09-26 PROCEDURE — 3074F SYST BP LT 130 MM HG: CPT | Performed by: NURSE PRACTITIONER

## 2023-09-26 PROCEDURE — 3078F DIAST BP <80 MM HG: CPT | Performed by: NURSE PRACTITIONER

## 2023-09-26 PROCEDURE — G8427 DOCREV CUR MEDS BY ELIG CLIN: HCPCS | Performed by: NURSE PRACTITIONER

## 2023-09-26 NOTE — PROGRESS NOTES
pheonobarb as had been lethargic, but low   threshold to resume; cont CIWA    Treatment: ICU Admit; CT Perfusion; MRI Brain; High dose IV thiamine; Neuro   consult; EEG;  Monitor for w/d; hold scheduled pheonobarb as had been   lethargic, but low threshold to resume; cont CIWA    Thank you,    Tahir Samano  CDI  Options provided:  -- Thiamine deficiency  -- Prophylactic use of thiamine only  -- Other - I will add my own diagnosis  -- Disagree - Not applicable / Not valid  -- Disagree - Clinically unable to determine / Unknown  -- Refer to Clinical Documentation Reviewer    PROVIDER RESPONSE TEXT:    This patient has a prophylactic use of thiamine only.     Query created by: Tahir Samano on 9/22/2023 10:09 AM      Electronically signed by:  Ana Cristina Hare MD 9/26/2023 1:03 PM

## 2023-09-27 ENCOUNTER — TELEPHONE (OUTPATIENT)
Age: 83
End: 2023-09-27

## 2023-09-27 RX ORDER — BUPROPION HYDROCHLORIDE 150 MG/1
1 TABLET ORAL DAILY
COMMUNITY
Start: 2023-04-24

## 2023-09-27 NOTE — PROGRESS NOTES
atherosclerosis    Cervical right internal carotid artery: Patent with proximal atherosclerosis  causing less than 50% stenosis by NASCET criteria. Cervical left internal carotid artery: Patent with proximal atherosclerosis  causing less than 50% stenosis by NASCET criteria. Right vertebral artery: Unchanged proximal occlusion with reconstitution in the  distal V2 segment which demonstrates moderate atherosclerosis    Left vertebral artery: Mild to moderate atherosclerosis    CTA HEAD:    Right cavernous internal carotid artery: Mild to moderate atherosclerosis    Left cavernous internal carotid artery: Mild to moderate atherosclerosis    Anterior cerebral arteries: Patent    Anterior communicating artery: Patent    Right middle cerebral artery: Patent    Left middle cerebral artery: Patent    Posterior communicating arteries: Patent    Posterior cerebral arteries: Patent    Basilar artery: Patent    Distal vertebral arteries: Mild to moderate atherosclerosis    Measurements use NASCET criteria. CT perfusion brain: No evidence for acute ischemic abnormality. Moderate emphysema. Severe multilevel cervical spondylosis    Impression  1. No evidence for acute large vessel arterial occlusion. 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 Other  mild to moderate atherosclerotic changes as described above. No evidence for  acute ischemic perfusion abnormality. 2. Moderate emphysema. 3. Severe cervical spondylosis.       EEG Result:      Carotid Doppler:        Recent Labs:  Lab Results   Component Value Date    WBC 4.2 09/09/2023    HGB 11.5 (L) 09/09/2023    HCT 35.5 (L) 09/09/2023    .7 (H) 09/09/2023     09/09/2023     Lab Results   Component Value Date     09/08/2023    K 4.3 09/08/2023     09/08/2023    CO2 30 09/08/2023    BUN 17 09/08/2023    CREATININE 1.72 (H) 09/08/2023    GLUCOSE 99 09/08/2023    CALCIUM 9.5 09/08/2023

## 2023-10-05 ENCOUNTER — HOSPITAL ENCOUNTER (OUTPATIENT)
Facility: HOSPITAL | Age: 83
Discharge: HOME | End: 2023-10-05
Payer: MEDICARE

## 2023-10-05 DIAGNOSIS — Z09 HOSPITAL DISCHARGE FOLLOW-UP: ICD-10-CM

## 2023-10-05 DIAGNOSIS — R56.9 SEIZURE-LIKE ACTIVITY (HCC): ICD-10-CM

## 2023-10-05 DIAGNOSIS — F41.9 ANXIETY: Primary | ICD-10-CM

## 2023-10-05 PROCEDURE — 95714 VEEG EA 12-26 HR UNMNTR: CPT

## 2023-10-06 RX ORDER — ALPRAZOLAM 0.25 MG/1
0.25 TABLET ORAL 3 TIMES DAILY PRN
Qty: 75 TABLET | Refills: 0 | Status: SHIPPED | OUTPATIENT
Start: 2023-10-06 | End: 2023-11-05

## 2023-10-06 NOTE — TELEPHONE ENCOUNTER
Last seen [id number] and no inconsistencies noted in . Med last refilled [id number].   No inconsistencies noted to

## 2023-11-08 DIAGNOSIS — F41.9 ANXIETY: ICD-10-CM

## 2023-11-10 ENCOUNTER — TELEPHONE (OUTPATIENT)
Facility: CLINIC | Age: 83
End: 2023-11-10

## 2023-11-10 NOTE — TELEPHONE ENCOUNTER
Pt called in regards to needing a refill of Xanax pt was not sure of mg to the [address] at [facility]  A previous encounter is already started with a pending medication

## 2023-11-11 RX ORDER — ALPRAZOLAM 0.5 MG/1
TABLET ORAL
Qty: 30 TABLET | Refills: 0 | Status: SHIPPED | OUTPATIENT
Start: 2023-11-11 | End: 2023-12-08

## 2023-12-01 ENCOUNTER — TELEPHONE (OUTPATIENT)
Facility: CLINIC | Age: 83
End: 2023-12-01
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
## 2023-12-03 ENCOUNTER — PATIENT MESSAGE (OUTPATIENT)
Age: 83
End: 2023-12-03

## 2023-12-04 ENCOUNTER — TELEPHONE (OUTPATIENT)
Age: 83
End: 2023-12-04
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
## 2023-12-08 DIAGNOSIS — F41.9 ANXIETY: ICD-10-CM

## 2023-12-08 RX ORDER — ALPRAZOLAM 0.5 MG/1
TABLET ORAL
Qty: 30 TABLET | Refills: 0 | Status: CANCELLED | OUTPATIENT
Start: 2023-12-08 | End: 2024-01-04

## 2023-12-08 NOTE — TELEPHONE ENCOUNTER
3/20/2024 10:30 AMOFFICE VISITBon [facility] Renu Grande, APRN - NP Appointment Notes: 6  month follow up and med review

## 2023-12-11 RX ORDER — ALPRAZOLAM 0.25 MG/1
0.25 TABLET ORAL 3 TIMES DAILY PRN
Qty: 75 TABLET | Refills: 0 | Status: SHIPPED | OUTPATIENT
Start: 2023-12-11 | End: 2024-01-10

## 2023-12-11 NOTE — TELEPHONE ENCOUNTER
Last seen [id number] and med last refilled . [id number] and 0.5 was called in secondary to  not In stock.   Going back to normal dosage

## 2024-01-10 RX ORDER — ATORVASTATIN CALCIUM 40 MG/1
TABLET, FILM COATED ORAL
Qty: 90 TABLET | Refills: 1 | Status: SHIPPED | OUTPATIENT
Start: 2024-01-10

## (undated) LAB
INR BLD: 96
PT POC: 8 SECONDS
VALID INTERNAL CONTROL?: YES

## (undated) DEVICE — SYRINGE 50ML E/T

## (undated) DEVICE — KENDALL RADIOLUCENT FOAM MONITORING ELECTRODE -RECTANGULAR SHAPE: Brand: KENDALL

## (undated) DEVICE — CANNULA CUSH AD W/ 14FT TBG

## (undated) DEVICE — NDL PRT INJ NSAF BLNT 18GX1.5 --

## (undated) DEVICE — SOLIDIFIER MEDC 1200ML -- CONVERT TO 356117

## (undated) DEVICE — NEONATAL-ADULT SPO2 SENSOR: Brand: NELLCOR

## (undated) DEVICE — NDL FLTR TIP 5 MIC 18GX1.5IN --

## (undated) DEVICE — ADULT SPO2 SENSOR: Brand: NELLCOR

## (undated) DEVICE — BASIN EMSIS 16OZ GRAPHITE PLAS KID SHP MOLD GRAD FOR ORAL

## (undated) DEVICE — BAG BELONG PT PERS CLEAR HANDL

## (undated) DEVICE — Device

## (undated) DEVICE — SET ADMIN 16ML TBNG L100IN 2 Y INJ SITE IV PIGGY BK DISP

## (undated) DEVICE — SYR 3ML LL TIP 1/10ML GRAD --

## (undated) DEVICE — CATH IV AUTOGRD BC BLU 22GA 25 -- INSYTE

## (undated) DEVICE — KIT COLON W/ 1.1OZ LUB AND 2 END

## (undated) DEVICE — ELECTRODE,RADIOTRANSLUCENT,FOAM,3PK: Brand: MEDLINE

## (undated) DEVICE — 1200 GUARD II KIT W/5MM TUBE W/O VAC TUBE: Brand: GUARDIAN

## (undated) DEVICE — BITEBLOCK ENDOSCP 60FR MAXI WHT POLYETH STURDY W/ VELC WVN

## (undated) DEVICE — SIMPLICITY FLUFF UNDERPAD 23X36, MODERATE: Brand: SIMPLICITY

## (undated) DEVICE — CATH IV AUTOGRD BC PNK 20GA 25 -- INSYTE

## (undated) DEVICE — SYR 5ML 1/5 GRAD LL NSAF LF --